# Patient Record
Sex: FEMALE | Race: WHITE | NOT HISPANIC OR LATINO | Employment: OTHER | URBAN - METROPOLITAN AREA
[De-identification: names, ages, dates, MRNs, and addresses within clinical notes are randomized per-mention and may not be internally consistent; named-entity substitution may affect disease eponyms.]

---

## 2023-03-17 PROBLEM — S83.422A SPRAIN OF LATERAL COLLATERAL LIGAMENT OF LEFT KNEE: Status: ACTIVE | Noted: 2023-03-17

## 2023-03-17 PROBLEM — M79.10 MYALGIA: Status: ACTIVE | Noted: 2023-03-17

## 2023-03-17 PROBLEM — K44.9 HIATAL HERNIA: Status: ACTIVE | Noted: 2023-03-17

## 2023-03-17 PROBLEM — E55.9 VITAMIN D DEFICIENCY: Status: ACTIVE | Noted: 2023-03-17

## 2023-03-17 PROBLEM — N18.4 CKD STAGE 4 DUE TO TYPE 2 DIABETES MELLITUS (MULTI): Status: ACTIVE | Noted: 2023-03-17

## 2023-03-17 PROBLEM — E66.9 CLASS 1 OBESITY WITH BODY MASS INDEX (BMI) OF 31.0 TO 31.9 IN ADULT: Status: ACTIVE | Noted: 2023-03-17

## 2023-03-17 PROBLEM — E66.811 CLASS 1 OBESITY WITH BODY MASS INDEX (BMI) OF 31.0 TO 31.9 IN ADULT: Status: ACTIVE | Noted: 2023-03-17

## 2023-03-17 PROBLEM — E11.9 DIABETES MELLITUS, TYPE 2 (MULTI): Status: ACTIVE | Noted: 2023-03-17

## 2023-03-17 PROBLEM — D22.9 ATYPICAL MOLE: Status: ACTIVE | Noted: 2023-03-17

## 2023-03-17 PROBLEM — M25.569 KNEE PAIN: Status: ACTIVE | Noted: 2023-03-17

## 2023-03-17 PROBLEM — N28.9 RENAL DYSFUNCTION: Status: ACTIVE | Noted: 2023-03-17

## 2023-03-17 PROBLEM — K21.9 GERD (GASTROESOPHAGEAL REFLUX DISEASE): Status: ACTIVE | Noted: 2023-03-17

## 2023-03-17 PROBLEM — I25.10 CAD (CORONARY ATHEROSCLEROTIC DISEASE): Status: ACTIVE | Noted: 2023-03-17

## 2023-03-17 PROBLEM — R06.83 SNORING: Status: ACTIVE | Noted: 2023-03-17

## 2023-03-17 PROBLEM — R53.83 TIREDNESS: Status: ACTIVE | Noted: 2023-03-17

## 2023-03-17 PROBLEM — M17.10 PRIMARY LOCALIZED OSTEOARTHRITIS OF KNEE: Status: ACTIVE | Noted: 2023-03-17

## 2023-03-17 PROBLEM — E78.5 HYPERLIPIDEMIA: Status: ACTIVE | Noted: 2023-03-17

## 2023-03-17 PROBLEM — D64.9 ANEMIA: Status: ACTIVE | Noted: 2023-03-17

## 2023-03-17 PROBLEM — R53.83 FATIGUE: Status: ACTIVE | Noted: 2023-03-17

## 2023-03-17 PROBLEM — I10 HYPERTENSION: Status: ACTIVE | Noted: 2023-03-17

## 2023-03-17 PROBLEM — E11.22 CKD STAGE 4 DUE TO TYPE 2 DIABETES MELLITUS (MULTI): Status: ACTIVE | Noted: 2023-03-17

## 2023-03-17 PROBLEM — E79.0 ELEVATED URIC ACID IN BLOOD: Status: ACTIVE | Noted: 2023-03-17

## 2023-03-17 PROBLEM — M65.30 TRIGGER FINGER: Status: ACTIVE | Noted: 2023-03-17

## 2023-03-17 PROBLEM — H26.9 CATARACT: Status: ACTIVE | Noted: 2023-03-17

## 2023-03-17 PROBLEM — R79.82 ELEVATED C-REACTIVE PROTEIN (CRP): Status: ACTIVE | Noted: 2023-03-17

## 2023-03-17 PROBLEM — R00.2 PALPITATIONS: Status: ACTIVE | Noted: 2023-03-17

## 2023-03-17 RX ORDER — PEN NEEDLE, DIABETIC 30 GX3/16"
NEEDLE, DISPOSABLE MISCELLANEOUS
COMMUNITY
End: 2024-03-04 | Stop reason: SDUPTHER

## 2023-03-17 RX ORDER — SYRINGE AND NEEDLE,INSULIN,1ML 31GX15/64"
SYRINGE, EMPTY DISPOSABLE MISCELLANEOUS
COMMUNITY
End: 2024-03-04 | Stop reason: SDUPTHER

## 2023-03-17 RX ORDER — INSULIN GLARGINE 100 [IU]/ML
75 INJECTION, SOLUTION SUBCUTANEOUS EVERY EVENING
COMMUNITY
Start: 2014-08-29 | End: 2023-10-20 | Stop reason: SDUPTHER

## 2023-03-17 RX ORDER — ROSUVASTATIN CALCIUM 10 MG/1
1 TABLET, COATED ORAL DAILY
COMMUNITY
Start: 2020-09-17

## 2023-03-17 RX ORDER — VITAMIN E MIXED 400 UNIT
CAPSULE ORAL
COMMUNITY
Start: 2018-03-22

## 2023-03-17 RX ORDER — VITAMIN E (DL,TOCOPHERYL ACET) 90 MG
1 CAPSULE ORAL DAILY
COMMUNITY
Start: 2018-03-22

## 2023-03-17 RX ORDER — TALC
250 POWDER (GRAM) TOPICAL DAILY
COMMUNITY

## 2023-03-17 RX ORDER — S-ADENOSYLMETHIONINE SUL TOSYL 400 MG
1 TABLET, DELAYED RELEASE (ENTERIC COATED) ORAL DAILY
COMMUNITY

## 2023-03-17 RX ORDER — INSULIN LISPRO 200 [IU]/ML
140 INJECTION, SOLUTION SUBCUTANEOUS
COMMUNITY
Start: 2021-01-28 | End: 2023-10-20 | Stop reason: SDUPTHER

## 2023-03-17 RX ORDER — LISINOPRIL 40 MG/1
1 TABLET ORAL DAILY
COMMUNITY
Start: 2020-06-12 | End: 2023-10-09 | Stop reason: ALTCHOICE

## 2023-03-17 RX ORDER — SPIRONOLACTONE 25 MG
TABLET ORAL
COMMUNITY
Start: 2018-03-22

## 2023-03-17 RX ORDER — METOPROLOL TARTRATE 50 MG/1
0.25 TABLET ORAL DAILY
COMMUNITY
Start: 2014-05-21 | End: 2023-10-09 | Stop reason: ALTCHOICE

## 2023-03-17 RX ORDER — NAPROXEN SODIUM 220 MG/1
TABLET, FILM COATED ORAL
COMMUNITY
Start: 2020-09-15 | End: 2023-10-20 | Stop reason: SINTOL

## 2023-03-17 RX ORDER — BLOOD SUGAR DIAGNOSTIC
STRIP MISCELLANEOUS
COMMUNITY

## 2023-03-17 RX ORDER — PEN NEEDLE, DIABETIC 30 GX3/16"
NEEDLE, DISPOSABLE MISCELLANEOUS
COMMUNITY
End: 2024-02-05 | Stop reason: SDUPTHER

## 2023-03-22 ENCOUNTER — APPOINTMENT (OUTPATIENT)
Dept: PRIMARY CARE | Facility: CLINIC | Age: 73
End: 2023-03-22
Payer: MEDICARE

## 2023-05-30 ENCOUNTER — TELEPHONE (OUTPATIENT)
Dept: PRIMARY CARE | Facility: CLINIC | Age: 73
End: 2023-05-30
Payer: MEDICARE

## 2023-06-01 NOTE — TELEPHONE ENCOUNTER
Can you verify what medication she is talking about?  Nothing on her list and I have not seen her in about 2 years

## 2023-06-07 ENCOUNTER — OFFICE VISIT (OUTPATIENT)
Dept: PRIMARY CARE | Facility: CLINIC | Age: 73
End: 2023-06-07
Payer: MEDICARE

## 2023-06-07 VITALS
DIASTOLIC BLOOD PRESSURE: 60 MMHG | HEIGHT: 66 IN | BODY MASS INDEX: 30.44 KG/M2 | TEMPERATURE: 98.1 F | SYSTOLIC BLOOD PRESSURE: 110 MMHG | WEIGHT: 189.4 LBS

## 2023-06-07 DIAGNOSIS — E79.0 ELEVATED URIC ACID IN BLOOD: ICD-10-CM

## 2023-06-07 DIAGNOSIS — N18.4 CKD STAGE 4 DUE TO TYPE 2 DIABETES MELLITUS (MULTI): ICD-10-CM

## 2023-06-07 DIAGNOSIS — E11.69 TYPE 2 DIABETES MELLITUS WITH OTHER SPECIFIED COMPLICATION, WITH LONG-TERM CURRENT USE OF INSULIN (MULTI): ICD-10-CM

## 2023-06-07 DIAGNOSIS — Z00.00 ROUTINE GENERAL MEDICAL EXAMINATION AT HEALTH CARE FACILITY: ICD-10-CM

## 2023-06-07 DIAGNOSIS — I10 HYPERTENSION, UNSPECIFIED TYPE: Primary | ICD-10-CM

## 2023-06-07 DIAGNOSIS — Z79.4 TYPE 2 DIABETES MELLITUS WITH OTHER SPECIFIED COMPLICATION, WITH LONG-TERM CURRENT USE OF INSULIN (MULTI): ICD-10-CM

## 2023-06-07 DIAGNOSIS — E11.22 CKD STAGE 4 DUE TO TYPE 2 DIABETES MELLITUS (MULTI): ICD-10-CM

## 2023-06-07 PROCEDURE — 3078F DIAST BP <80 MM HG: CPT | Performed by: FAMILY MEDICINE

## 2023-06-07 PROCEDURE — 1170F FXNL STATUS ASSESSED: CPT | Performed by: FAMILY MEDICINE

## 2023-06-07 PROCEDURE — G0439 PPPS, SUBSEQ VISIT: HCPCS | Performed by: FAMILY MEDICINE

## 2023-06-07 PROCEDURE — 1159F MED LIST DOCD IN RCRD: CPT | Performed by: FAMILY MEDICINE

## 2023-06-07 PROCEDURE — 99214 OFFICE O/P EST MOD 30 MIN: CPT | Performed by: FAMILY MEDICINE

## 2023-06-07 PROCEDURE — 1160F RVW MEDS BY RX/DR IN RCRD: CPT | Performed by: FAMILY MEDICINE

## 2023-06-07 PROCEDURE — 1036F TOBACCO NON-USER: CPT | Performed by: FAMILY MEDICINE

## 2023-06-07 PROCEDURE — 4010F ACE/ARB THERAPY RXD/TAKEN: CPT | Performed by: FAMILY MEDICINE

## 2023-06-07 PROCEDURE — 3066F NEPHROPATHY DOC TX: CPT | Performed by: FAMILY MEDICINE

## 2023-06-07 PROCEDURE — 3074F SYST BP LT 130 MM HG: CPT | Performed by: FAMILY MEDICINE

## 2023-06-07 RX ORDER — CYANOCOBALAMIN (VITAMIN B-12) 500 MCG
TABLET ORAL
COMMUNITY

## 2023-06-07 ASSESSMENT — ACTIVITIES OF DAILY LIVING (ADL)
GROCERY_SHOPPING: INDEPENDENT
BATHING: INDEPENDENT
DOING_HOUSEWORK: INDEPENDENT
MANAGING_FINANCES: INDEPENDENT
TAKING_MEDICATION: INDEPENDENT
DRESSING: INDEPENDENT

## 2023-06-07 ASSESSMENT — PATIENT HEALTH QUESTIONNAIRE - PHQ9
2. FEELING DOWN, DEPRESSED OR HOPELESS: NOT AT ALL
1. LITTLE INTEREST OR PLEASURE IN DOING THINGS: NOT AT ALL
SUM OF ALL RESPONSES TO PHQ9 QUESTIONS 1 AND 2: 0
1. LITTLE INTEREST OR PLEASURE IN DOING THINGS: NOT AT ALL
2. FEELING DOWN, DEPRESSED OR HOPELESS: NOT AT ALL
SUM OF ALL RESPONSES TO PHQ9 QUESTIONS 1 AND 2: 0

## 2023-06-07 NOTE — PATIENT INSTRUCTIONS
For scheduling general referrals or orders please call [address] [place] [address] ([phone]).  For scheduling radiology appointments please call [phone].

## 2023-06-07 NOTE — PROGRESS NOTES
Subjective   Reason for Visit: Li Valdovinos is an 73 y.o. female here for a Medicare Wellness visit.     Past Medical, Surgical, and Family History reviewed and updated in chart.         HPI  **care team   endo- dr. Arroyo- following DM2 - retiring so needs new   nephrology- Dr. Ortiz-  for CKD4- gfr down to 18 last month. Considering getting on transplant list. [place] to hold off dialysis   GI Dr. Vollweiler-follows regularly for GERD and colon polyps last colonoscopy she believes was 2018- due in 2023  cardio-Dr Cooper   Podiatry- for nail care  Eye- follows [facility]      Patient Self Assessment of Health Status  Patient Self Assessment: Good    Nutrition and Exercise  Current Diet: Well Balanced Diet  Adequate Fluid Intake: Yes  Caffeine: Yes  Exercise Frequency: Regularly    Functional Ability/Level of Safety  Cognitive Impairment Observed: No cognitive impairment observed  Cognitive Impairment Reported: No cognitive impairment reported by patient or family    Home Safety Risk Factors: None    Patient Care Team:  Remington Rondon DO as PCP - General     Review of Systems   Constitutional:  Negative for activity change, appetite change and fatigue.   HENT:  Negative for congestion, postnasal drip and sinus pressure.    Eyes:  Negative for pain and visual disturbance.   Respiratory:  Negative for cough and shortness of breath.    Cardiovascular:  Negative for chest pain, palpitations and leg swelling.   Gastrointestinal:  Negative for abdominal pain, blood in stool, constipation, diarrhea, nausea and vomiting.   Endocrine: Negative for cold intolerance and heat intolerance.   Genitourinary:  Negative for dysuria and menstrual problem.   Musculoskeletal:  Negative for arthralgias and joint swelling.   Skin:  Negative for rash and wound.   Neurological:  Negative for dizziness, light-headedness and headaches.   Psychiatric/Behavioral:  Negative for dysphoric mood and sleep disturbance. The patient is not  "nervous/anxious.        Objective   Vitals:  /60   Temp 36.7 °C (98.1 °F)   Ht 1.664 m (5' 5.5\")   Wt 85.9 kg (189 lb 6.4 oz)   BMI 31.04 kg/m²       Physical Exam  Vitals and nursing note reviewed.   Constitutional:       Appearance: Normal appearance. She is normal weight.   HENT:      Head: Normocephalic and atraumatic.      Right Ear: External ear normal.      Left Ear: External ear normal.      Mouth/Throat:      Mouth: Mucous membranes are moist.   Eyes:      Conjunctiva/sclera: Conjunctivae normal.   Cardiovascular:      Rate and Rhythm: Normal rate and regular rhythm.      Heart sounds: Normal heart sounds.   Pulmonary:      Effort: Pulmonary effort is normal.      Breath sounds: Normal breath sounds.   Musculoskeletal:         General: No swelling.      Cervical back: Normal range of motion and neck supple.   Skin:     General: Skin is warm and dry.      Capillary Refill: Capillary refill takes less than 2 seconds.   Neurological:      General: No focal deficit present.      Mental Status: She is alert. Mental status is at baseline.   Psychiatric:         Mood and Affect: Mood normal.         Behavior: Behavior normal.         Thought Content: Thought content normal.         Judgment: Judgment normal.         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  Discussed with patient given the severity of her kidney disease I think she should be managed by endocrinology.  We will follow-up after above unless concerns sooner  Continue to follow the range remainder of her care team as listed " above

## 2023-06-13 ASSESSMENT — ACTIVITIES OF DAILY LIVING (ADL)
MANAGING_FINANCES: INDEPENDENT
DRESSING: INDEPENDENT
GROCERY_SHOPPING: INDEPENDENT
TAKING_MEDICATION: INDEPENDENT
BATHING: INDEPENDENT
DOING_HOUSEWORK: INDEPENDENT

## 2023-06-13 ASSESSMENT — ENCOUNTER SYMPTOMS
HEADACHES: 0
CONSTIPATION: 0
SHORTNESS OF BREATH: 0
FATIGUE: 0
PALPITATIONS: 0
BLOOD IN STOOL: 0
VOMITING: 0
NAUSEA: 0
LIGHT-HEADEDNESS: 0
NERVOUS/ANXIOUS: 0
JOINT SWELLING: 0
SINUS PRESSURE: 0
DIARRHEA: 0
SLEEP DISTURBANCE: 0
DYSPHORIC MOOD: 0
WOUND: 0
DYSURIA: 0
ARTHRALGIAS: 0
APPETITE CHANGE: 0
EYE PAIN: 0
COUGH: 0
ACTIVITY CHANGE: 0
DIZZINESS: 0
ABDOMINAL PAIN: 0

## 2023-06-13 ASSESSMENT — PATIENT HEALTH QUESTIONNAIRE - PHQ9
SUM OF ALL RESPONSES TO PHQ9 QUESTIONS 1 AND 2: 0
1. LITTLE INTEREST OR PLEASURE IN DOING THINGS: NOT AT ALL
2. FEELING DOWN, DEPRESSED OR HOPELESS: NOT AT ALL

## 2023-06-20 ENCOUNTER — LAB (OUTPATIENT)
Dept: LAB | Facility: LAB | Age: 73
End: 2023-06-20
Payer: MEDICARE

## 2023-06-20 DIAGNOSIS — Z79.4 TYPE 2 DIABETES MELLITUS WITH OTHER SPECIFIED COMPLICATION, WITH LONG-TERM CURRENT USE OF INSULIN (MULTI): ICD-10-CM

## 2023-06-20 DIAGNOSIS — I10 HYPERTENSION, UNSPECIFIED TYPE: ICD-10-CM

## 2023-06-20 DIAGNOSIS — E11.22 CKD STAGE 4 DUE TO TYPE 2 DIABETES MELLITUS (MULTI): ICD-10-CM

## 2023-06-20 DIAGNOSIS — N18.4 CKD STAGE 4 DUE TO TYPE 2 DIABETES MELLITUS (MULTI): ICD-10-CM

## 2023-06-20 DIAGNOSIS — E11.69 TYPE 2 DIABETES MELLITUS WITH OTHER SPECIFIED COMPLICATION, WITH LONG-TERM CURRENT USE OF INSULIN (MULTI): ICD-10-CM

## 2023-06-20 DIAGNOSIS — E79.0 ELEVATED URIC ACID IN BLOOD: ICD-10-CM

## 2023-06-20 LAB
ALANINE AMINOTRANSFERASE (SGPT) (U/L) IN SER/PLAS: 9 U/L (ref 7–45)
ALBUMIN (G/DL) IN SER/PLAS: 3.8 G/DL (ref 3.4–5)
ALKALINE PHOSPHATASE (U/L) IN SER/PLAS: 47 U/L (ref 33–136)
ANION GAP IN SER/PLAS: 12 MMOL/L (ref 10–20)
ASPARTATE AMINOTRANSFERASE (SGOT) (U/L) IN SER/PLAS: 13 U/L (ref 9–39)
BASOPHILS (10*3/UL) IN BLOOD BY AUTOMATED COUNT: 0.04 X10E9/L (ref 0–0.1)
BASOPHILS/100 LEUKOCYTES IN BLOOD BY AUTOMATED COUNT: 0.5 % (ref 0–2)
BILIRUBIN TOTAL (MG/DL) IN SER/PLAS: 1 MG/DL (ref 0–1.2)
CALCIUM (MG/DL) IN SER/PLAS: 9.7 MG/DL (ref 8.6–10.6)
CARBON DIOXIDE, TOTAL (MMOL/L) IN SER/PLAS: 25 MMOL/L (ref 21–32)
CHLORIDE (MMOL/L) IN SER/PLAS: 104 MMOL/L (ref 98–107)
CHOLESTEROL (MG/DL) IN SER/PLAS: 122 MG/DL (ref 0–199)
CHOLESTEROL IN HDL (MG/DL) IN SER/PLAS: 64 MG/DL
CHOLESTEROL/HDL RATIO: 1.9
CREATININE (MG/DL) IN SER/PLAS: 2.55 MG/DL (ref 0.5–1.05)
EOSINOPHILS (10*3/UL) IN BLOOD BY AUTOMATED COUNT: 0.15 X10E9/L (ref 0–0.4)
EOSINOPHILS/100 LEUKOCYTES IN BLOOD BY AUTOMATED COUNT: 1.9 % (ref 0–6)
ERYTHROCYTE DISTRIBUTION WIDTH (RATIO) BY AUTOMATED COUNT: 12.1 % (ref 11.5–14.5)
ERYTHROCYTE MEAN CORPUSCULAR HEMOGLOBIN CONCENTRATION (G/DL) BY AUTOMATED: 31 G/DL (ref 32–36)
ERYTHROCYTE MEAN CORPUSCULAR VOLUME (FL) BY AUTOMATED COUNT: 97 FL (ref 80–100)
ERYTHROCYTES (10*6/UL) IN BLOOD BY AUTOMATED COUNT: 3.51 X10E12/L (ref 4–5.2)
ESTIMATED AVERAGE GLUCOSE FOR HBA1C: 163 MG/DL
GFR FEMALE: 19 ML/MIN/1.73M2
GLUCOSE (MG/DL) IN SER/PLAS: 105 MG/DL (ref 74–99)
HEMATOCRIT (%) IN BLOOD BY AUTOMATED COUNT: 34.2 % (ref 36–46)
HEMOGLOBIN (G/DL) IN BLOOD: 10.6 G/DL (ref 12–16)
HEMOGLOBIN A1C/HEMOGLOBIN TOTAL IN BLOOD: 7.3 %
IMMATURE GRANULOCYTES/100 LEUKOCYTES IN BLOOD BY AUTOMATED COUNT: 0.3 % (ref 0–0.9)
LDL: 46 MG/DL (ref 0–99)
LEUKOCYTES (10*3/UL) IN BLOOD BY AUTOMATED COUNT: 8 X10E9/L (ref 4.4–11.3)
LYMPHOCYTES (10*3/UL) IN BLOOD BY AUTOMATED COUNT: 2.09 X10E9/L (ref 0.8–3)
LYMPHOCYTES/100 LEUKOCYTES IN BLOOD BY AUTOMATED COUNT: 26.3 % (ref 13–44)
MAGNESIUM (MG/DL) IN SER/PLAS: 2.05 MG/DL (ref 1.6–2.4)
MONOCYTES (10*3/UL) IN BLOOD BY AUTOMATED COUNT: 0.73 X10E9/L (ref 0.05–0.8)
MONOCYTES/100 LEUKOCYTES IN BLOOD BY AUTOMATED COUNT: 9.2 % (ref 2–10)
NEUTROPHILS (10*3/UL) IN BLOOD BY AUTOMATED COUNT: 4.92 X10E9/L (ref 1.6–5.5)
NEUTROPHILS/100 LEUKOCYTES IN BLOOD BY AUTOMATED COUNT: 61.8 % (ref 40–80)
NRBC (PER 100 WBCS) BY AUTOMATED COUNT: 0 /100 WBC (ref 0–0)
PLATELETS (10*3/UL) IN BLOOD AUTOMATED COUNT: 279 X10E9/L (ref 150–450)
POTASSIUM (MMOL/L) IN SER/PLAS: 4.8 MMOL/L (ref 3.5–5.3)
PROTEIN TOTAL: 7.3 G/DL (ref 6.4–8.2)
SODIUM (MMOL/L) IN SER/PLAS: 136 MMOL/L (ref 136–145)
TRIGLYCERIDE (MG/DL) IN SER/PLAS: 59 MG/DL (ref 0–149)
URATE (MG/DL) IN SER/PLAS: 8.3 MG/DL (ref 2.3–6.7)
UREA NITROGEN (MG/DL) IN SER/PLAS: 53 MG/DL (ref 6–23)
VLDL: 12 MG/DL (ref 0–40)

## 2023-06-20 PROCEDURE — 85025 COMPLETE CBC W/AUTO DIFF WBC: CPT

## 2023-06-20 PROCEDURE — 83735 ASSAY OF MAGNESIUM: CPT

## 2023-06-20 PROCEDURE — 83036 HEMOGLOBIN GLYCOSYLATED A1C: CPT

## 2023-06-20 PROCEDURE — 84550 ASSAY OF BLOOD/URIC ACID: CPT

## 2023-06-20 PROCEDURE — 80061 LIPID PANEL: CPT

## 2023-06-20 PROCEDURE — 36415 COLL VENOUS BLD VENIPUNCTURE: CPT

## 2023-06-20 PROCEDURE — 80053 COMPREHEN METABOLIC PANEL: CPT

## 2023-06-21 ENCOUNTER — TELEPHONE (OUTPATIENT)
Dept: PRIMARY CARE | Facility: CLINIC | Age: 73
End: 2023-06-21
Payer: MEDICARE

## 2023-06-21 NOTE — TELEPHONE ENCOUNTER
Patient would like recent blood work results, she went to ER for a gout flare recently and was given prednisone. Do you want her to check her uric acid level?

## 2023-06-23 DIAGNOSIS — M1A.9XX0 CHRONIC GOUT WITHOUT TOPHUS, UNSPECIFIED CAUSE, UNSPECIFIED SITE: Primary | ICD-10-CM

## 2023-06-23 RX ORDER — ALLOPURINOL 100 MG/1
50 TABLET ORAL EVERY OTHER DAY
Qty: 8 TABLET | Refills: 3 | Status: SHIPPED | OUTPATIENT
Start: 2023-06-23 | End: 2023-10-09 | Stop reason: SDUPTHER

## 2023-06-23 NOTE — RESULT ENCOUNTER NOTE
Let patient know her uric acid level is up.  When she is out of her gout flare she can start allopurinol 50 mg which would be half a pill every other day.  This is the maximum given her kidney function good her kidney function continues to decrease.  Please make sure she is following with nephrology.  Her anemia is stable and likely due to chronic kidney disease  A1c is about stable    **DO NOT CLOSE UNTIL YOU SPEAK TO PATIENT**

## 2023-06-23 NOTE — TELEPHONE ENCOUNTER
----- Message from Remington Rondon DO sent at 6/23/2023  9:16 AM EDT -----  Let patient know her uric acid level is up.  When she is out of her gout flare she can start allopurinol 50 mg which would be half a pill every other day.  This is the maximum given her kidney function good her kidney function continues to decrease.  Please make sure she is following with nephrology.  Her anemia is stable and likely due to chronic kidney disease  A1c is about stable    ##DO NOT CLOSE UNTIL YOU SPEAK TO PATIENT##

## 2023-06-26 NOTE — TELEPHONE ENCOUNTER
Spoke with patient, relayed results to her. She states her nephrologist filled her allopunerol and told her it was okay for her to take daily

## 2023-09-06 VITALS
TEMPERATURE: 98.1 F | OXYGEN SATURATION: 99 % | HEIGHT: 66 IN | RESPIRATION RATE: 20 BRPM | DIASTOLIC BLOOD PRESSURE: 69 MMHG | BODY MASS INDEX: 29.23 KG/M2 | HEART RATE: 72 BPM | SYSTOLIC BLOOD PRESSURE: 132 MMHG | WEIGHT: 181.88 LBS

## 2023-09-13 PROBLEM — K63.5 COLON POLYPS: Status: ACTIVE | Noted: 2023-09-13

## 2023-09-13 PROBLEM — E79.0 ASYMPTOMATIC HYPERURICEMIA: Status: ACTIVE | Noted: 2023-09-13

## 2023-09-13 PROBLEM — S83.92XA LEFT KNEE SPRAIN: Status: ACTIVE | Noted: 2023-09-13

## 2023-09-13 PROBLEM — D63.1 ANEMIA OF CHRONIC RENAL FAILURE: Status: ACTIVE | Noted: 2023-09-13

## 2023-09-13 PROBLEM — Z79.4 UNCONTROLLED TYPE 2 DIABETES MELLITUS WITH HYPERGLYCEMIA, WITH LONG-TERM CURRENT USE OF INSULIN (MULTI): Status: ACTIVE | Noted: 2023-09-13

## 2023-09-13 PROBLEM — Z78.0 MENOPAUSE: Status: ACTIVE | Noted: 2023-09-13

## 2023-09-13 PROBLEM — N18.9 ANEMIA OF CHRONIC RENAL FAILURE: Status: ACTIVE | Noted: 2023-09-13

## 2023-09-13 PROBLEM — E11.65 UNCONTROLLED TYPE 2 DIABETES MELLITUS WITH HYPERGLYCEMIA, WITH LONG-TERM CURRENT USE OF INSULIN (MULTI): Status: ACTIVE | Noted: 2023-09-13

## 2023-09-13 PROBLEM — M19.90 ARTHRITIS: Status: ACTIVE | Noted: 2023-09-13

## 2023-09-13 RX ORDER — METOPROLOL TARTRATE 25 MG/1
0.5 TABLET, FILM COATED ORAL DAILY
COMMUNITY
End: 2023-10-09 | Stop reason: SDUPTHER

## 2023-09-13 RX ORDER — UBIDECARENONE 30 MG
CAPSULE ORAL DAILY
COMMUNITY
End: 2023-10-20 | Stop reason: ALTCHOICE

## 2023-09-13 RX ORDER — GLYBURIDE-METFORMIN HYDROCHLORIDE 5; 500 MG/1; MG/1
2 TABLET ORAL
COMMUNITY
End: 2023-10-20 | Stop reason: ALTCHOICE

## 2023-09-13 RX ORDER — VITAMIN E 268 MG
400 CAPSULE ORAL DAILY
COMMUNITY
Start: 2013-03-18

## 2023-09-13 RX ORDER — FAMOTIDINE 40 MG/1
TABLET, FILM COATED ORAL
COMMUNITY
End: 2023-10-20 | Stop reason: ALTCHOICE

## 2023-09-13 RX ORDER — PREDNISONE 20 MG/1
TABLET ORAL
COMMUNITY
Start: 2023-06-19 | End: 2024-04-16 | Stop reason: WASHOUT

## 2023-09-13 RX ORDER — FERROUS SULFATE 325(65) MG
325 TABLET ORAL 2 TIMES DAILY
COMMUNITY
Start: 2013-03-18 | End: 2023-10-20 | Stop reason: ALTCHOICE

## 2023-09-13 RX ORDER — LISINOPRIL 10 MG/1
20 TABLET ORAL DAILY
COMMUNITY
End: 2023-10-16 | Stop reason: SDUPTHER

## 2023-09-13 RX ORDER — LISINOPRIL 20 MG/1
1 TABLET ORAL DAILY
COMMUNITY
Start: 2020-06-12 | End: 2023-10-09 | Stop reason: ALTCHOICE

## 2023-10-09 ENCOUNTER — OFFICE VISIT (OUTPATIENT)
Dept: PRIMARY CARE | Facility: CLINIC | Age: 73
End: 2023-10-09
Payer: MEDICARE

## 2023-10-09 VITALS
BODY MASS INDEX: 30.75 KG/M2 | TEMPERATURE: 97.7 F | WEIGHT: 184.8 LBS | DIASTOLIC BLOOD PRESSURE: 76 MMHG | SYSTOLIC BLOOD PRESSURE: 130 MMHG

## 2023-10-09 DIAGNOSIS — M1A.9XX0 CHRONIC GOUT WITHOUT TOPHUS, UNSPECIFIED CAUSE, UNSPECIFIED SITE: ICD-10-CM

## 2023-10-09 DIAGNOSIS — Z12.39 BREAST SCREENING: ICD-10-CM

## 2023-10-09 DIAGNOSIS — N18.4 CKD STAGE 4 DUE TO TYPE 2 DIABETES MELLITUS (MULTI): Primary | ICD-10-CM

## 2023-10-09 DIAGNOSIS — Z78.0 POST-MENOPAUSAL: ICD-10-CM

## 2023-10-09 DIAGNOSIS — I10 PRIMARY HYPERTENSION: ICD-10-CM

## 2023-10-09 DIAGNOSIS — Z12.31 ENCOUNTER FOR SCREENING MAMMOGRAM FOR MALIGNANT NEOPLASM OF BREAST: ICD-10-CM

## 2023-10-09 DIAGNOSIS — E11.22 CKD STAGE 4 DUE TO TYPE 2 DIABETES MELLITUS (MULTI): Primary | ICD-10-CM

## 2023-10-09 PROCEDURE — 4010F ACE/ARB THERAPY RXD/TAKEN: CPT | Performed by: FAMILY MEDICINE

## 2023-10-09 PROCEDURE — 1125F AMNT PAIN NOTED PAIN PRSNT: CPT | Performed by: FAMILY MEDICINE

## 2023-10-09 PROCEDURE — 1159F MED LIST DOCD IN RCRD: CPT | Performed by: FAMILY MEDICINE

## 2023-10-09 PROCEDURE — 99214 OFFICE O/P EST MOD 30 MIN: CPT | Performed by: FAMILY MEDICINE

## 2023-10-09 PROCEDURE — 1160F RVW MEDS BY RX/DR IN RCRD: CPT | Performed by: FAMILY MEDICINE

## 2023-10-09 PROCEDURE — 3078F DIAST BP <80 MM HG: CPT | Performed by: FAMILY MEDICINE

## 2023-10-09 PROCEDURE — 1036F TOBACCO NON-USER: CPT | Performed by: FAMILY MEDICINE

## 2023-10-09 PROCEDURE — 3066F NEPHROPATHY DOC TX: CPT | Performed by: FAMILY MEDICINE

## 2023-10-09 PROCEDURE — 3051F HG A1C>EQUAL 7.0%<8.0%: CPT | Performed by: FAMILY MEDICINE

## 2023-10-09 PROCEDURE — 3075F SYST BP GE 130 - 139MM HG: CPT | Performed by: FAMILY MEDICINE

## 2023-10-09 RX ORDER — ALLOPURINOL 100 MG/1
50 TABLET ORAL EVERY OTHER DAY
Qty: 23 TABLET | Refills: 0 | Status: SHIPPED | OUTPATIENT
Start: 2023-10-09 | End: 2024-02-09 | Stop reason: SDUPTHER

## 2023-10-09 RX ORDER — METOPROLOL TARTRATE 25 MG/1
12.5 TABLET, FILM COATED ORAL DAILY
Qty: 45 TABLET | Refills: 3 | Status: SHIPPED | OUTPATIENT
Start: 2023-10-09 | End: 2024-03-04 | Stop reason: SDUPTHER

## 2023-10-09 ASSESSMENT — ENCOUNTER SYMPTOMS
LIGHT-HEADEDNESS: 0
NAUSEA: 0
JOINT SWELLING: 1
WOUND: 0
DIARRHEA: 0
VOMITING: 0
SINUS PRESSURE: 0
EYE PAIN: 0
CONSTIPATION: 0
BLOOD IN STOOL: 0
SHORTNESS OF BREATH: 0
DIZZINESS: 0
APPETITE CHANGE: 0
ARTHRALGIAS: 0
COUGH: 0
DYSURIA: 0
SLEEP DISTURBANCE: 0
PALPITATIONS: 0
ACTIVITY CHANGE: 0
NERVOUS/ANXIOUS: 0
FATIGUE: 0
ABDOMINAL PAIN: 0
HEADACHES: 0
DYSPHORIC MOOD: 0

## 2023-10-09 ASSESSMENT — PATIENT HEALTH QUESTIONNAIRE - PHQ9
SUM OF ALL RESPONSES TO PHQ9 QUESTIONS 1 AND 2: 0
1. LITTLE INTEREST OR PLEASURE IN DOING THINGS: NOT AT ALL
2. FEELING DOWN, DEPRESSED OR HOPELESS: NOT AT ALL
1. LITTLE INTEREST OR PLEASURE IN DOING THINGS: NOT AT ALL
2. FEELING DOWN, DEPRESSED OR HOPELESS: NOT AT ALL
SUM OF ALL RESPONSES TO PHQ9 QUESTIONS 1 AND 2: 0

## 2023-10-09 NOTE — PROGRESS NOTES
Subjective   Patient ID: Li Valdovinos is a 73 y.o. female who presents for Follow-up.    HPI   **care team   endo- Dr. Franco - following DM2 -now establish.  Has a follow-up next week  nephrology- Dr. Mcbride-  for CKD4-has follow-up next week  GI Dr. Vollweiler-follows regularly for GERD and colon polyps last colonoscopy she believes was 2021- due 2024  cardio-Dr Cooper   Podiatry- for nail care  Eye- follows [facility]     Gout - having flares on 50 mg allopurinol every other day  Wondering if this dose can be increased    No new concerns  Review of Systems   Constitutional:  Negative for activity change, appetite change and fatigue.   HENT:  Negative for congestion, postnasal drip and sinus pressure.    Eyes:  Negative for pain and visual disturbance.   Respiratory:  Negative for cough and shortness of breath.    Cardiovascular:  Negative for chest pain, palpitations and leg swelling.   Gastrointestinal:  Negative for abdominal pain, blood in stool, constipation, diarrhea, nausea and vomiting.   Endocrine: Negative for cold intolerance and heat intolerance.   Genitourinary:  Negative for dysuria and menstrual problem.   Musculoskeletal:  Positive for joint swelling (great toe). Negative for arthralgias.   Skin:  Negative for rash and wound.   Neurological:  Negative for dizziness, light-headedness and headaches.   Psychiatric/Behavioral:  Negative for dysphoric mood and sleep disturbance. The patient is not nervous/anxious.        Objective   /76   Temp 36.5 °C (97.7 °F)   Wt 83.8 kg (184 lb 12.8 oz)   BMI 30.75 kg/m²     Physical Exam  Vitals and nursing note reviewed.   Constitutional:       Appearance: Normal appearance. She is normal weight.   HENT:      Head: Normocephalic and atraumatic.      Right Ear: External ear normal.      Left Ear: External ear normal.      Mouth/Throat:      Mouth: Mucous membranes are moist.   Eyes:      Conjunctiva/sclera: Conjunctivae normal.   Cardiovascular:      Rate  and Rhythm: Normal rate and regular rhythm.      Heart sounds: Normal heart sounds.   Pulmonary:      Effort: Pulmonary effort is normal.      Breath sounds: Normal breath sounds.   Musculoskeletal:         General: No swelling.      Cervical back: Normal range of motion and neck supple.      Right lower leg: No edema.      Left lower leg: No edema.   Skin:     General: Skin is warm and dry.      Capillary Refill: Capillary refill takes less than 2 seconds.   Neurological:      General: No focal deficit present.      Mental Status: She is alert. Mental status is at baseline.   Psychiatric:         Mood and Affect: Mood normal.         Behavior: Behavior normal.         Thought Content: Thought content normal.         Judgment: Judgment normal.         Assessment/Plan   1. Chronic gout without tophus, unspecified cause, unspecified site  Dw pt need to talk to nephro about increased dosing.  - allopurinol (Zyloprim) 100 mg tablet; Take 0.5 tablets (50 mg) by mouth every other day.  Dispense: 23 tablet; Refill: 0    2. 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 Primary hypertension  Controlled. Continue current medicines/regimen.   - metoprolol tartrate (Lopressor) 25 mg tablet; Take 0.5 tablets (12.5 mg) by mouth once daily.  Dispense: 45 tablet; Refill: 3    4. Breast screening  - BI mammo bilateral screening tomosynthesis; Future    5. Encounter for screening mammogram for malignant neoplasm of breast  - BI mammo bilateral screening tomosynthesis; Future    6. Post-menopausal  - XR DEXA bone density; Future    Cont with care team  Awv in 6/24  unless concern sooner   Remington Rondon, DO

## 2023-10-10 ENCOUNTER — APPOINTMENT (OUTPATIENT)
Dept: CARDIOLOGY | Facility: CLINIC | Age: 73
End: 2023-10-10
Payer: MEDICARE

## 2023-10-16 ENCOUNTER — LAB (OUTPATIENT)
Dept: LAB | Facility: LAB | Age: 73
End: 2023-10-16
Payer: MEDICARE

## 2023-10-16 ENCOUNTER — OFFICE VISIT (OUTPATIENT)
Dept: NEPHROLOGY | Facility: CLINIC | Age: 73
End: 2023-10-16
Payer: MEDICARE

## 2023-10-16 VITALS
WEIGHT: 182.8 LBS | SYSTOLIC BLOOD PRESSURE: 130 MMHG | DIASTOLIC BLOOD PRESSURE: 85 MMHG | TEMPERATURE: 98.1 F | BODY MASS INDEX: 30.46 KG/M2 | RESPIRATION RATE: 18 BRPM | HEIGHT: 65 IN | HEART RATE: 75 BPM

## 2023-10-16 DIAGNOSIS — N18.4 CKD STAGE 4 DUE TO TYPE 2 DIABETES MELLITUS (MULTI): ICD-10-CM

## 2023-10-16 DIAGNOSIS — E11.22 CKD STAGE 4 DUE TO TYPE 2 DIABETES MELLITUS (MULTI): ICD-10-CM

## 2023-10-16 DIAGNOSIS — N18.4 CKD (CHRONIC KIDNEY DISEASE) STAGE 4, GFR 15-29 ML/MIN (MULTI): Primary | ICD-10-CM

## 2023-10-16 LAB
25(OH)D3 SERPL-MCNC: 40 NG/ML (ref 30–100)
ALBUMIN SERPL BCP-MCNC: 4.5 G/DL (ref 3.4–5)
ANION GAP SERPL CALC-SCNC: 14 MMOL/L (ref 10–20)
BUN SERPL-MCNC: 34 MG/DL (ref 6–23)
CALCIUM SERPL-MCNC: 10 MG/DL (ref 8.6–10.6)
CHLORIDE SERPL-SCNC: 106 MMOL/L (ref 98–107)
CO2 SERPL-SCNC: 24 MMOL/L (ref 21–32)
CREAT SERPL-MCNC: 2.32 MG/DL (ref 0.5–1.05)
CREAT UR-MCNC: 76.4 MG/DL (ref 20–320)
GFR SERPL CREATININE-BSD FRML MDRD: 22 ML/MIN/1.73M*2
GLUCOSE SERPL-MCNC: 132 MG/DL (ref 74–99)
HCT VFR BLD AUTO: 37.9 % (ref 36–46)
HGB BLD-MCNC: 11.9 G/DL (ref 12–16)
MICROALBUMIN UR-MCNC: <7 MG/L
MICROALBUMIN/CREAT UR: NORMAL MG/G{CREAT}
PHOSPHATE SERPL-MCNC: 2.8 MG/DL (ref 2.5–4.9)
POTASSIUM SERPL-SCNC: 4.2 MMOL/L (ref 3.5–5.3)
PTH-INTACT SERPL-MCNC: 84 PG/ML (ref 18.5–88)
SODIUM SERPL-SCNC: 140 MMOL/L (ref 136–145)

## 2023-10-16 PROCEDURE — 83970 ASSAY OF PARATHORMONE: CPT

## 2023-10-16 PROCEDURE — 3051F HG A1C>EQUAL 7.0%<8.0%: CPT | Performed by: INTERNAL MEDICINE

## 2023-10-16 PROCEDURE — 3075F SYST BP GE 130 - 139MM HG: CPT | Performed by: INTERNAL MEDICINE

## 2023-10-16 PROCEDURE — 1160F RVW MEDS BY RX/DR IN RCRD: CPT | Performed by: INTERNAL MEDICINE

## 2023-10-16 PROCEDURE — 1036F TOBACCO NON-USER: CPT | Performed by: INTERNAL MEDICINE

## 2023-10-16 PROCEDURE — 99214 OFFICE O/P EST MOD 30 MIN: CPT | Performed by: INTERNAL MEDICINE

## 2023-10-16 PROCEDURE — 3066F NEPHROPATHY DOC TX: CPT | Performed by: INTERNAL MEDICINE

## 2023-10-16 PROCEDURE — 4010F ACE/ARB THERAPY RXD/TAKEN: CPT | Performed by: INTERNAL MEDICINE

## 2023-10-16 PROCEDURE — 3062F POS MACROALBUMINURIA REV: CPT | Performed by: INTERNAL MEDICINE

## 2023-10-16 PROCEDURE — 36415 COLL VENOUS BLD VENIPUNCTURE: CPT

## 2023-10-16 PROCEDURE — 82306 VITAMIN D 25 HYDROXY: CPT

## 2023-10-16 PROCEDURE — 1125F AMNT PAIN NOTED PAIN PRSNT: CPT | Performed by: INTERNAL MEDICINE

## 2023-10-16 PROCEDURE — 80069 RENAL FUNCTION PANEL: CPT

## 2023-10-16 PROCEDURE — 82043 UR ALBUMIN QUANTITATIVE: CPT

## 2023-10-16 PROCEDURE — 85014 HEMATOCRIT: CPT

## 2023-10-16 PROCEDURE — 82570 ASSAY OF URINE CREATININE: CPT

## 2023-10-16 PROCEDURE — 85018 HEMOGLOBIN: CPT

## 2023-10-16 PROCEDURE — 1159F MED LIST DOCD IN RCRD: CPT | Performed by: INTERNAL MEDICINE

## 2023-10-16 PROCEDURE — 3079F DIAST BP 80-89 MM HG: CPT | Performed by: INTERNAL MEDICINE

## 2023-10-16 RX ORDER — LISINOPRIL 20 MG/1
20 TABLET ORAL DAILY
Qty: 60 TABLET | Refills: 3 | Status: SHIPPED | OUTPATIENT
Start: 2023-10-16 | End: 2024-03-04 | Stop reason: SDUPTHER

## 2023-10-16 NOTE — PATIENT INSTRUCTIONS
Please check blood and urine tests today, if GFR is more than 19 ml/min I recommend starting empagliflozin

## 2023-10-16 NOTE — PROGRESS NOTES
"For follow up, doing well.  No complaints  No hospitalizations/illness since last visit  Not checking BP at home  Denies orthostatic symptoms    RoS negative for all other systems except as noted above.    Visit Vitals  /85 (BP Location: Right arm, Patient Position: Sitting, BP Cuff Size: Adult)   Pulse 75   Temp 36.7 °C (98.1 °F) (Temporal)   Resp 18   Ht 1.651 m (5' 5\")   Wt 82.9 kg (182 lb 12.8 oz)   BMI 30.42 kg/m²   Smoking Status Never   BSA 1.95 m²      No distress  HEENT:  moist, no pallor  No edema anabella LE  Breath sounds anabella equal, clear  S1 S2 regular, normal, no rub or murmur  Abdomen soft, non tender  AAO x3, non focal      Sodium   Date/Time Value Ref Range Status   06/20/2023 09:17  136 - 145 mmol/L Final   04/21/2022 10:48  136 - 145 mmol/L Final   11/24/2020 03:13  136 - 145 mmol/L Final     Potassium   Date/Time Value Ref Range Status   06/20/2023 09:17 AM 4.8 3.5 - 5.3 mmol/L Final   04/21/2022 10:48 AM 4.6 3.5 - 5.3 mmol/L Final   11/24/2020 03:13 PM 4.3 3.5 - 5.3 mmol/L Final     Chloride   Date/Time Value Ref Range Status   06/20/2023 09:17  98 - 107 mmol/L Final   04/21/2022 10:48  98 - 107 mmol/L Final   11/24/2020 03:13  98 - 107 mmol/L Final     Urea Nitrogen   Date/Time Value Ref Range Status   06/20/2023 09:17 AM 53 (H) 6 - 23 mg/dL Final   04/21/2022 10:48 AM 30 (H) 6 - 23 mg/dL Final   11/24/2020 03:13 PM 34 (H) 6 - 23 mg/dL Final     Creatinine   Date/Time Value Ref Range Status   06/20/2023 09:17 AM 2.55 (H) 0.50 - 1.05 mg/dL Final   04/21/2022 10:48 AM 2.08 (H) 0.50 - 1.05 mg/dL Final   11/24/2020 03:13 PM 2.09 (H) 0.50 - 1.05 mg/dL Final     GFR Female   Date/Time Value Ref Range Status   06/20/2023 09:17 AM 19 (A) >90 mL/min/1.73m2 Final     Comment:      CALCULATIONS OF ESTIMATED GFR ARE PERFORMED   USING THE 2021 CKD-EPI STUDY REFIT EQUATION   WITHOUT THE RACE VARIABLE FOR THE IDMS-TRACEABLE   CREATININE " METHODS.    https://jasn.asnjournals.org/content/early/2021/09/22/ASN.[email or web address]   04/21/2022 10:48 AM 25 (A) >90 mL/min/1.73m2 Final     Comment:      CALCULATIONS OF ESTIMATED GFR ARE PERFORMED   USING THE 2021 CKD-EPI STUDY REFIT EQUATION   WITHOUT THE RACE VARIABLE FOR THE IDMS-TRACEABLE   CREATININE METHODS.    https://jasn.asnjournals.org/content/early/2021/09/22/ASN.[email or web address]       Calcium   Date/Time Value Ref Range Status   06/20/2023 09:17 AM 9.7 8.6 - 10.6 mg/dL Final   04/21/2022 10:48 AM 9.5 8.6 - 10.3 mg/dL Final   11/24/2020 03:13 PM 9.3 8.6 - 10.6 mg/dL Final     Phosphorus   Date/Time Value Ref Range Status   08/18/2020 08:30 AM 4.4 2.5 - 4.9 mg/dL Final     Comment:      The performance characteristics of phosphorus testing in   heparinized plasma have been validated by the individual     laboratory site where testing is performed. Testing    on heparinized plasma is not approved by the FDA;    however, such approval is not necessary.       Vitamin D, 25-Hydroxy   Date/Time Value Ref Range Status   06/09/2020 09:09 AM 38 ng/mL Final     Comment:     .  DEFICIENCY:         < 20   NG/ML  INSUFFICIENCY:      20-29  NG/ML  SUFFICIENCY:         NG/ML    THIS ASSAY ACCURATELY QUANTIFIES THE SUM OF  VITAMIN D3, 25-HYDROXY AND VIT D2,25-HYDROXY.     12/04/2018 03:24 PM 48 ng/mL Final     Comment:     .  DEFICIENCY:         < 20  NG/ML  INSUFFICIENCY:      20-29 NG/ML  OPTIMUM LEVEL:      30-80 NG/ML  POSSIBLE TOXICITY:  > 80  NG/ML    THIS ASSAY ACCURATELY QUANTIFIES THE SUM OF  VITAMIN D3, 25-HYDROXY AND VIT D2,25-HYDROXY.     08/13/2018 10:29 AM 29 (A) ng/mL Final     Comment:     .  DEFICIENCY:         < 20  NG/ML  INSUFFICIENCY:      20-29 NG/ML  OPTIMUM LEVEL:      30-80 NG/ML  POSSIBLE TOXICITY:  > 80  NG/ML    THIS ASSAY ACCURATELY QUANTIFIES THE SUM OF  VITAMIN D3, 25-HYDROXY AND VIT D2,25-HYDROXY.       Albumin/Creatine Ratio   Date/Time Value Ref Range Status   08/18/2020 08:30 AM SEE  COMMENT 0.0 - 30.0 ug/mg crt Final     Comment:     One or more analytes used in this calculation   is outside of the analytical measurement range.  Calculation cannot be performed.       Hemoglobin   Date/Time Value Ref Range Status   06/20/2023 09:17 AM 10.6 (L) 12.0 - 16.0 g/dL Final   04/21/2022 10:48 AM 11.6 (L) 12.0 - 16.0 g/dL Final   11/24/2020 03:13 PM 10.5 (L) 12.0 - 16.0 g/dL Final        Current Outpatient Medications:     allopurinol (Zyloprim) 100 mg tablet, Take 0.5 tablets (50 mg) by mouth every other day., Disp: 23 tablet, Rfl: 0    alpha tocopherol (Vitamin E) 268 mg (400 unit) capsule, Take 1 capsule (400 Units) by mouth twice a day., Disp: , Rfl:     Bacillus subtilis-inulin 1.5 billion cell-1 gram tablet,chewable, Chew once daily.  1 by mouth every day, Disp: , Rfl:     blood-glucose sensor (DEXCOM G6 SENSOR MISC), Use as directed for continuous glucose monitoring. Change every 10 days, Disp: , Rfl:     cholecalciferol (Vitamin D-3) 10 MCG (400 UNIT) tablet, Take by mouth., Disp: , Rfl:     co-enzyme Q-10 30 mg capsule, Take by mouth once daily.  1 po once daily, Disp: , Rfl:     coenzyme Q10 400 mg capsule, TAKE AS DIRECTED., Disp: , Rfl:     metoprolol tartrate (Lopressor) 25 mg tablet, Take 0.5 tablets (12.5 mg) by mouth once daily., Disp: 45 tablet, Rfl: 3    aspirin 81 mg chewable tablet, USE AS DIRECTED., Disp: , Rfl:     blood sugar diagnostic (ONETOUCH ULTRA BLUE TEST STRIP MISC), Use to monitor glucose level four times a day, Disp: , Rfl:     blood-glucose transmitter (DEXCOM G4 TRANSMITTER MISC), Use as directed for continous glucose monitoring change every 10 days, Disp: , Rfl:     famotidine (Pepcid) 40 mg tablet, 1 po twice daily, Disp: , Rfl:     ferrous sulfate 325 (65 Fe) MG tablet, Take 1 tablet (325 mg) by mouth 2 times a day., Disp: , Rfl:     glyburide-metformin (Glucovance) 5-500 mg tablet, Take 2 tablets by mouth 2 times a day with meals., Disp: , Rfl:     insulin glargine  "(Lantus) 100 unit/mL injection, Inject 75 Units under the skin once daily in the evening., Disp: , Rfl:     insulin lispro (HumaLOG KwikPen Insulin) 200 unit/mL (3 mL) insulin pen pen, Inject 140 Units under the skin. per 24 hours to cover meals, Disp: , Rfl:     insulin syringe-needle U-100 1 mL 31 gauge x 15/64\" syringe, Use and discard 1 syringe daily, Disp: , Rfl:     Lactobacillus acidophilus (PROBIOTIC ORAL), Use as directed, Disp: , Rfl:     lisinopril 10 mg tablet, Take 1 tablet (10 mg) by mouth once daily., Disp: , Rfl:     lutein 20 mg capsule, TAKE AS DIRECTED., Disp: , Rfl:     magnesium oxide (Mag-Ox) 250 mg magnesium tablet, , Disp: , Rfl:     OneTouch Ultra Test strip, In Vitro Strip - Use to monitor glucose level four times a day, Disp: , Rfl:     pen needle, diabetic 32 gauge x 5/32\" needle, Use 3 daily, Disp: , Rfl:     pen needle, diabetic 32 gauge x 5/32\" needle, Use and discard 3 pen needles daily., Disp: , Rfl:     predniSONE (Deltasone) 20 mg tablet, TAKE 1 TABLET BY MOUTH TWICE DAILY FOR 1 DAY. THEN TAKE 1/2 PILLL 2 TIMES A DAY FOR 5 DAYS, Disp: , Rfl:     rosuvastatin (Crestor) 10 mg tablet, Take 1 tablet (10 mg) by mouth once daily., Disp: , Rfl:     s-adenosylmethionine (LAILA-e, Enteric Coated,) 400 mg tablet,delayed release (DR/EC), , Disp: , Rfl:     TURMERIC ORAL, Turmeric Curcumin Oral Capsule  Refills: 0      Start : 24-Jun-2021  Active, Disp: , Rfl:     VITAMIN B COMPLEX ORAL, Take 1 tablet by mouth once daily. Super B-Complex, Disp: , Rfl:     vitamin E 180 mg (400 unit) capsule, Vitamin E 400 UNIT Oral Capsule  Refills: 0      Start : 22-Mar-2018  Active, Disp: , Rfl:     vitamin-B complex split tablet, Take 1 tablet (2 half tablet) by mouth once daily., Disp: , Rfl:      73-year-old with longstanding history of diabetes, hypertension, substantial NSAID use in the past with chronic kidney disease for follow-up.    #CKD G4 A1: Exact etiology unclear, possibly nonproteinuric diabetic " kidney disease or hypertensive kidney disease with worsening from chronic NSAID use in the past.  No labs available since June, advised her to check labs today.  Repeat BMP shows creatinine 2.3 mg per DL with EGFR 22 mL/min, stable.  I discussed with her again the benefits and risks of SGLT2 inhibitors and ways of mitigating some of the risks such as urinary tract infections.  I recommended that if her repeat GFR is more than 20, she should start Jardiance daily.  Continue to avoid NSAIDs.  Continue 0.6 g/kg/day protein limited, predominantly plant source diet.    #Hypertension: Goal blood pressure 120/80 mmHg, at goal.  Continue metoprolol and lisinopril.  2.5 g/day sodium restricted diet.    RTC: 3 months

## 2023-10-20 ENCOUNTER — OFFICE VISIT (OUTPATIENT)
Dept: ENDOCRINOLOGY | Facility: CLINIC | Age: 73
End: 2023-10-20
Payer: MEDICARE

## 2023-10-20 VITALS
DIASTOLIC BLOOD PRESSURE: 67 MMHG | HEART RATE: 96 BPM | HEIGHT: 65 IN | WEIGHT: 176 LBS | SYSTOLIC BLOOD PRESSURE: 125 MMHG | BODY MASS INDEX: 29.32 KG/M2

## 2023-10-20 DIAGNOSIS — N18.4 CKD STAGE 4 DUE TO TYPE 2 DIABETES MELLITUS (MULTI): Primary | ICD-10-CM

## 2023-10-20 DIAGNOSIS — I10 PRIMARY HYPERTENSION: ICD-10-CM

## 2023-10-20 DIAGNOSIS — R93.1 AGATSTON CORONARY ARTERY CALCIUM SCORE BETWEEN 200 AND 399: ICD-10-CM

## 2023-10-20 DIAGNOSIS — E11.65 TYPE 2 DIABETES MELLITUS WITH HYPERGLYCEMIA, UNSPECIFIED WHETHER LONG TERM INSULIN USE (MULTI): ICD-10-CM

## 2023-10-20 DIAGNOSIS — E11.22 CKD STAGE 4 DUE TO TYPE 2 DIABETES MELLITUS (MULTI): Primary | ICD-10-CM

## 2023-10-20 DIAGNOSIS — E78.2 MIXED HYPERLIPIDEMIA: ICD-10-CM

## 2023-10-20 DIAGNOSIS — I25.10 ATHEROSCLEROSIS OF CORONARY ARTERY OF NATIVE HEART WITHOUT ANGINA PECTORIS, UNSPECIFIED VESSEL OR LESION TYPE: ICD-10-CM

## 2023-10-20 LAB — POC HEMOGLOBIN A1C: 6.6 % (ref 4.2–6.5)

## 2023-10-20 PROCEDURE — 1160F RVW MEDS BY RX/DR IN RCRD: CPT | Performed by: NURSE PRACTITIONER

## 2023-10-20 PROCEDURE — 3074F SYST BP LT 130 MM HG: CPT | Performed by: NURSE PRACTITIONER

## 2023-10-20 PROCEDURE — 95251 CONT GLUC MNTR ANALYSIS I&R: CPT | Performed by: NURSE PRACTITIONER

## 2023-10-20 PROCEDURE — 83036 HEMOGLOBIN GLYCOSYLATED A1C: CPT | Performed by: NURSE PRACTITIONER

## 2023-10-20 PROCEDURE — 1159F MED LIST DOCD IN RCRD: CPT | Performed by: NURSE PRACTITIONER

## 2023-10-20 PROCEDURE — 1036F TOBACCO NON-USER: CPT | Performed by: NURSE PRACTITIONER

## 2023-10-20 PROCEDURE — 99214 OFFICE O/P EST MOD 30 MIN: CPT | Performed by: NURSE PRACTITIONER

## 2023-10-20 PROCEDURE — 3066F NEPHROPATHY DOC TX: CPT | Performed by: NURSE PRACTITIONER

## 2023-10-20 PROCEDURE — 4010F ACE/ARB THERAPY RXD/TAKEN: CPT | Performed by: NURSE PRACTITIONER

## 2023-10-20 PROCEDURE — 3078F DIAST BP <80 MM HG: CPT | Performed by: NURSE PRACTITIONER

## 2023-10-20 PROCEDURE — 1125F AMNT PAIN NOTED PAIN PRSNT: CPT | Performed by: NURSE PRACTITIONER

## 2023-10-20 PROCEDURE — 3051F HG A1C>EQUAL 7.0%<8.0%: CPT | Performed by: NURSE PRACTITIONER

## 2023-10-20 PROCEDURE — 3062F POS MACROALBUMINURIA REV: CPT | Performed by: NURSE PRACTITIONER

## 2023-10-20 RX ORDER — INSULIN LISPRO 200 [IU]/ML
INJECTION, SOLUTION SUBCUTANEOUS
Qty: 400 ML | Refills: 3 | Status: SHIPPED | OUTPATIENT
Start: 2023-10-20 | End: 2024-03-04 | Stop reason: SDUPTHER

## 2023-10-20 RX ORDER — INSULIN GLARGINE 100 [IU]/ML
75 INJECTION, SOLUTION SUBCUTANEOUS EVERY EVENING
Qty: 30 ML | Refills: 3 | Status: SHIPPED | OUTPATIENT
Start: 2023-10-20 | End: 2024-03-04 | Stop reason: SDUPTHER

## 2023-10-20 ASSESSMENT — ENCOUNTER SYMPTOMS
SLEEP DISTURBANCE: 0
DIARRHEA: 0
NAUSEA: 0
CONSTIPATION: 0
ACTIVITY CHANGE: 0
FATIGUE: 0
DIZZINESS: 0
WEAKNESS: 0
NUMBNESS: 0
POLYPHAGIA: 0
SHORTNESS OF BREATH: 0
APPETITE CHANGE: 0
SEIZURES: 0
NERVOUS/ANXIOUS: 0
FREQUENCY: 0
POLYDIPSIA: 0
PALPITATIONS: 0

## 2023-10-20 NOTE — PROGRESS NOTES
"Subjective   Li Valdovinos is a 73 y.o. female who presents for follow-up of Type 2 diabetes mellitus. The initial diagnosis of diabetes was made  Age 45 . The patient does have a known family history of diabetes father and paternal uncle, maternal cousins.    Known complications due to diabetes included none    Cardiovascular risk factors include advanced age (older than 55 for men, 65 for women), diabetes mellitus, dyslipidemia, and hypertension. The patient is on an ACE inhibitor or angiotensin II receptor blocker.      Current diabetes regimen is as follows:   Lantus 74 units at night  Humalog up to 150 units a day  Breakfast 15-20 Units  Lunch 35-40 Units  Dinner 60 Units      The patient is currently checking the blood glucose 8 times per day.  Patient is using: continuous glucose monitor    Hypoglycemia frequency: <1%  Hypoglycemia awareness: Yes     Exercise: daily   Meal panning: She is using avoidance of concentrated sweets.    Review of Systems   Constitutional:  Negative for activity change, appetite change and fatigue.   Respiratory:  Negative for shortness of breath.    Cardiovascular:  Negative for chest pain, palpitations and leg swelling.   Gastrointestinal:  Negative for constipation, diarrhea and nausea.   Endocrine: Negative for cold intolerance, heat intolerance, polydipsia, polyphagia and polyuria.   Genitourinary:  Negative for frequency.   Musculoskeletal:  Negative for gait problem.   Skin:  Negative for rash.   Neurological:  Negative for dizziness, seizures, weakness and numbness.   Psychiatric/Behavioral:  Negative for sleep disturbance and suicidal ideas. The patient is not nervous/anxious.        Objective   /67 (BP Location: Right arm, Patient Position: Sitting, BP Cuff Size: Adult)   Pulse 96   Ht 1.651 m (5' 5\")   Wt 79.8 kg (176 lb)   BMI 29.29 kg/m²   Physical Exam  Constitutional:       Appearance: Normal appearance.   Pulmonary:      Effort: Pulmonary effort is " normal.   Skin:     General: Skin is warm and dry.   Neurological:      Mental Status: She is alert and oriented to person, place, and time.   Psychiatric:         Mood and Affect: Mood normal.         Behavior: Behavior normal.         Thought Content: Thought content normal.         Judgment: Judgment normal.         Lab Review  Glucose (mg/dL)   Date Value   10/16/2023 132 (H)   06/20/2023 105 (H)   04/21/2022 98   11/24/2020 116 (H)     POC HEMOGLOBIN A1c (%)   Date Value   10/20/2023 6.6 (A)     Hemoglobin A1C (%)   Date Value   06/20/2023 7.3 (A)   04/21/2022 7.5 (A)   06/09/2020 6.3     Bicarbonate (mmol/L)   Date Value   10/16/2023 24   06/20/2023 25   04/21/2022 28   11/24/2020 22     Urea Nitrogen (mg/dL)   Date Value   10/16/2023 34 (H)   06/20/2023 53 (H)   04/21/2022 30 (H)   11/24/2020 34 (H)     Creatinine (mg/dL)   Date Value   10/16/2023 2.32 (H)   06/20/2023 2.55 (H)   04/21/2022 2.08 (H)   11/24/2020 2.09 (H)         Health Maintenance:   Foot Exam: July 2023, denies issues  Eye Exam:   Lipid Panel:  Urine Albumin:    Assessment/Plan   Diagnoses and all orders for this visit:  Type 2 diabetes mellitus with hyperglycemia, unspecified whether long term insulin use (CMS/[facility])  -     POCT glycosylated hemoglobin (Hb A1C) manually resulted    Type 2 diabetes mellitus, is at goal. A1c 6.6% at today's appointment    RX changes:   Continue Lantus 74 units  Humalog up to 150 units a day  Breakfast 15-20 Units  Lunch 35-40 Units  Dinner 60 Units INCREASE by 5 units, especially at large meals.   Hypertension: At goal. No changes.  Hyperlipidemia: At goal. No changes   Stage 4 CKD GFR 22. She was provided Jardiance and does not want to take due to side effect profile.   Education:  interpretation of lab results, CGM download.   Follow up: I recommend diabetes care be 6 months.

## 2023-10-20 NOTE — PATIENT INSTRUCTIONS
Type 2 diabetes mellitus, is at goal. A1c 6.6% at today's appointment    RX changes:   Continue Lantus 74 units  Humalog up to 150 units a day  Breakfast 15-20 Units  Lunch 35-40 Units  Dinner 60 Units INCREASE by 5 units, especially at large meals.   Hypertension: At goal. No changes.  Hyperlipidemia: At goal. No changes   Stage 4 CKD GFR 22. She was provided Jardiance and does not want to take due to side effect profile.   Education:  interpretation of lab results, CGM download.   Follow up: I recommend diabetes care be 3 months.

## 2023-11-21 ENCOUNTER — APPOINTMENT (OUTPATIENT)
Dept: PRIMARY CARE | Facility: CLINIC | Age: 73
End: 2023-11-21
Payer: MEDICARE

## 2024-01-31 DIAGNOSIS — N18.4 CKD STAGE 4 DUE TO TYPE 2 DIABETES MELLITUS (MULTI): Primary | ICD-10-CM

## 2024-01-31 DIAGNOSIS — E11.22 CKD STAGE 4 DUE TO TYPE 2 DIABETES MELLITUS (MULTI): Primary | ICD-10-CM

## 2024-02-02 ENCOUNTER — LAB (OUTPATIENT)
Dept: LAB | Facility: LAB | Age: 74
End: 2024-02-02
Payer: MEDICARE

## 2024-02-02 DIAGNOSIS — E11.22 CKD STAGE 4 DUE TO TYPE 2 DIABETES MELLITUS (MULTI): ICD-10-CM

## 2024-02-02 DIAGNOSIS — N18.4 CKD STAGE 4 DUE TO TYPE 2 DIABETES MELLITUS (MULTI): ICD-10-CM

## 2024-02-02 LAB
ALBUMIN SERPL BCP-MCNC: 4 G/DL (ref 3.4–5)
ANION GAP SERPL CALC-SCNC: 13 MMOL/L (ref 10–20)
BUN SERPL-MCNC: 24 MG/DL (ref 6–23)
CALCIUM SERPL-MCNC: 9.8 MG/DL (ref 8.6–10.6)
CHLORIDE SERPL-SCNC: 106 MMOL/L (ref 98–107)
CO2 SERPL-SCNC: 25 MMOL/L (ref 21–32)
CREAT SERPL-MCNC: 2.17 MG/DL (ref 0.5–1.05)
CREAT UR-MCNC: 53.4 MG/DL (ref 20–320)
EGFRCR SERPLBLD CKD-EPI 2021: 24 ML/MIN/1.73M*2
GLUCOSE SERPL-MCNC: 107 MG/DL (ref 74–99)
MICROALBUMIN UR-MCNC: <7 MG/L
MICROALBUMIN/CREAT UR: NORMAL MG/G{CREAT}
PHOSPHATE SERPL-MCNC: 3.3 MG/DL (ref 2.5–4.9)
POTASSIUM SERPL-SCNC: 4.6 MMOL/L (ref 3.5–5.3)
PTH-INTACT SERPL-MCNC: 52.7 PG/ML (ref 18.5–88)
SODIUM SERPL-SCNC: 139 MMOL/L (ref 136–145)

## 2024-02-02 PROCEDURE — 82043 UR ALBUMIN QUANTITATIVE: CPT

## 2024-02-02 PROCEDURE — 36415 COLL VENOUS BLD VENIPUNCTURE: CPT

## 2024-02-02 PROCEDURE — 80069 RENAL FUNCTION PANEL: CPT

## 2024-02-02 PROCEDURE — 82570 ASSAY OF URINE CREATININE: CPT

## 2024-02-02 PROCEDURE — 83970 ASSAY OF PARATHORMONE: CPT

## 2024-02-05 ENCOUNTER — OFFICE VISIT (OUTPATIENT)
Dept: NEPHROLOGY | Facility: CLINIC | Age: 74
End: 2024-02-05
Payer: MEDICARE

## 2024-02-05 VITALS
BODY MASS INDEX: 30.49 KG/M2 | WEIGHT: 183 LBS | HEIGHT: 65 IN | HEART RATE: 68 BPM | DIASTOLIC BLOOD PRESSURE: 81 MMHG | SYSTOLIC BLOOD PRESSURE: 123 MMHG

## 2024-02-05 DIAGNOSIS — N18.4 CKD STAGE 4 DUE TO TYPE 2 DIABETES MELLITUS (MULTI): Primary | ICD-10-CM

## 2024-02-05 DIAGNOSIS — E11.22 CKD STAGE 4 DUE TO TYPE 2 DIABETES MELLITUS (MULTI): Primary | ICD-10-CM

## 2024-02-05 PROCEDURE — 3066F NEPHROPATHY DOC TX: CPT | Performed by: INTERNAL MEDICINE

## 2024-02-05 PROCEDURE — 1036F TOBACCO NON-USER: CPT | Performed by: INTERNAL MEDICINE

## 2024-02-05 PROCEDURE — 1126F AMNT PAIN NOTED NONE PRSNT: CPT | Performed by: INTERNAL MEDICINE

## 2024-02-05 PROCEDURE — 4010F ACE/ARB THERAPY RXD/TAKEN: CPT | Performed by: INTERNAL MEDICINE

## 2024-02-05 PROCEDURE — 3079F DIAST BP 80-89 MM HG: CPT | Performed by: INTERNAL MEDICINE

## 2024-02-05 PROCEDURE — 3062F POS MACROALBUMINURIA REV: CPT | Performed by: INTERNAL MEDICINE

## 2024-02-05 PROCEDURE — 3074F SYST BP LT 130 MM HG: CPT | Performed by: INTERNAL MEDICINE

## 2024-02-05 PROCEDURE — 99214 OFFICE O/P EST MOD 30 MIN: CPT | Performed by: INTERNAL MEDICINE

## 2024-02-05 ASSESSMENT — PAIN SCALES - GENERAL: PAINLEVEL: 0-NO PAIN

## 2024-02-05 NOTE — PROGRESS NOTES
For follow up, doing well.  No complaints  No hospitalizations/illness since last visit  BP at home in 130s/80s  Denies orthostatic symptoms  Did not start SGLT2i and does not want to start it due to concern for side effects.    RoS negative for all other systems except as noted above.    Vitals:    02/05/24 1313   BP: 123/81   Pulse: 68     No distress  HEENT:  moist, no pallor  No edema anabella LE  Breath sounds anabella equal, clear  S1 S2 regular, normal, no rub or murmur  Abdomen soft  AAO x3, non focal    Sodium   Date/Time Value Ref Range Status   02/02/2024 08:23  136 - 145 mmol/L Final   10/16/2023 03:16  136 - 145 mmol/L Final   06/20/2023 09:17  136 - 145 mmol/L Final   04/21/2022 10:48  136 - 145 mmol/L Final   11/24/2020 03:13  136 - 145 mmol/L Final     Potassium   Date/Time Value Ref Range Status   02/02/2024 08:23 AM 4.6 3.5 - 5.3 mmol/L Final   10/16/2023 03:16 PM 4.2 3.5 - 5.3 mmol/L Final   06/20/2023 09:17 AM 4.8 3.5 - 5.3 mmol/L Final   04/21/2022 10:48 AM 4.6 3.5 - 5.3 mmol/L Final   11/24/2020 03:13 PM 4.3 3.5 - 5.3 mmol/L Final     Chloride   Date/Time Value Ref Range Status   02/02/2024 08:23  98 - 107 mmol/L Final   10/16/2023 03:16  98 - 107 mmol/L Final   06/20/2023 09:17  98 - 107 mmol/L Final   04/21/2022 10:48  98 - 107 mmol/L Final   11/24/2020 03:13  98 - 107 mmol/L Final     Urea Nitrogen   Date/Time Value Ref Range Status   02/02/2024 08:23 AM 24 (H) 6 - 23 mg/dL Final   10/16/2023 03:16 PM 34 (H) 6 - 23 mg/dL Final   06/20/2023 09:17 AM 53 (H) 6 - 23 mg/dL Final   04/21/2022 10:48 AM 30 (H) 6 - 23 mg/dL Final   11/24/2020 03:13 PM 34 (H) 6 - 23 mg/dL Final     Creatinine   Date/Time Value Ref Range Status   02/02/2024 08:23 AM 2.17 (H) 0.50 - 1.05 mg/dL Final   10/16/2023 03:16 PM 2.32 (H) 0.50 - 1.05 mg/dL Final   06/20/2023 09:17 AM 2.55 (H) 0.50 - 1.05 mg/dL Final   04/21/2022 10:48 AM 2.08 (H) 0.50 - 1.05 mg/dL Final    11/24/2020 03:13 PM 2.09 (H) 0.50 - 1.05 mg/dL Final     GFR Female   Date/Time Value Ref Range Status   06/20/2023 09:17 AM 19 (A) >90 mL/min/1.73m2 Final     Comment:      CALCULATIONS OF ESTIMATED GFR ARE PERFORMED   USING THE 2021 CKD-EPI STUDY REFIT EQUATION   WITHOUT THE RACE VARIABLE FOR THE IDMS-TRACEABLE   CREATININE METHODS.    https://jasn.asnjournals.org/content/early/2021/09/22/ASN.[email or web address]   04/21/2022 10:48 AM 25 (A) >90 mL/min/1.73m2 Final     Comment:      CALCULATIONS OF ESTIMATED GFR ARE PERFORMED   USING THE 2021 CKD-EPI STUDY REFIT EQUATION   WITHOUT THE RACE VARIABLE FOR THE IDMS-TRACEABLE   CREATININE METHODS.    https://jasn.asnjournals.org/content/early/2021/09/22/[email or web address]       eGFR   Date/Time Value Ref Range Status   02/02/2024 08:23 AM 24 (L) >60 mL/min/1.73m*2 Final     Comment:     Calculations of estimated GFR are performed using the 2021 CKD-EPI Study Refit equation without the race variable for the IDMS-Traceable creatinine methods.  https://jasn.asnjournals.org/content/early/2021/09/22/ASN.[email or web address]   10/16/2023 03:16 PM 22 (L) >60 mL/min/1.73m*2 Final     Comment:     Calculations of estimated GFR are performed using the 2021 CKD-EPI Study Refit equation without the race variable for the IDMS-Traceable creatinine methods.  https://jasn.asnjournals.org/content/early/2021/09/22/ASN.[email or web address]     Calcium   Date/Time Value Ref Range Status   02/02/2024 08:23 AM 9.8 8.6 - 10.6 mg/dL Final   10/16/2023 03:16 PM 10.0 8.6 - 10.6 mg/dL Final   06/20/2023 09:17 AM 9.7 8.6 - 10.6 mg/dL Final   04/21/2022 10:48 AM 9.5 8.6 - 10.3 mg/dL Final   11/24/2020 03:13 PM 9.3 8.6 - 10.6 mg/dL Final     Phosphorus   Date/Time Value Ref Range Status   02/02/2024 08:23 AM 3.3 2.5 - 4.9 mg/dL Final     Comment:     The performance characteristics of phosphorus testing in heparinized plasma have been validated by the individual  laboratory site where testing is performed. Testing on  heparinized plasma is not approved by the FDA; however, such approval is not necessary.   10/16/2023 03:16 PM 2.8 2.5 - 4.9 mg/dL Final     Comment:     The performance characteristics of phosphorus testing in heparinized plasma have been validated by the individual  laboratory site where testing is performed. Testing on heparinized plasma is not approved by the FDA; however, such approval is not necessary.   08/18/2020 08:30 AM 4.4 2.5 - 4.9 mg/dL Final     Comment:      The performance characteristics of phosphorus testing in   heparinized plasma have been validated by the individual     laboratory site where testing is performed. Testing    on heparinized plasma is not approved by the FDA;    however, such approval is not necessary.       Parathyroid Hormone, Intact   Date/Time Value Ref Range Status   02/02/2024 08:23 AM 52.7 18.5 - 88.0 pg/mL Final   10/16/2023 03:16 PM 84.0 18.5 - 88.0 pg/mL Final     Vitamin D, 25-Hydroxy, Total   Date/Time Value Ref Range Status   10/16/2023 03:16 PM 40 30 - 100 ng/mL Final     Vitamin D, 25-Hydroxy   Date/Time Value Ref Range Status   06/09/2020 09:09 AM 38 ng/mL Final     Comment:     .  DEFICIENCY:         < 20   NG/ML  INSUFFICIENCY:      20-29  NG/ML  SUFFICIENCY:         NG/ML    THIS ASSAY ACCURATELY QUANTIFIES THE SUM OF  VITAMIN D3, 25-HYDROXY AND VIT D2,25-HYDROXY.     12/04/2018 03:24 PM 48 ng/mL Final     Comment:     .  DEFICIENCY:         < 20  NG/ML  INSUFFICIENCY:      20-29 NG/ML  OPTIMUM LEVEL:      30-80 NG/ML  POSSIBLE TOXICITY:  > 80  NG/ML    THIS ASSAY ACCURATELY QUANTIFIES THE SUM OF  VITAMIN D3, 25-HYDROXY AND VIT D2,25-HYDROXY.     08/13/2018 10:29 AM 29 (A) ng/mL Final     Comment:     .  DEFICIENCY:         < 20  NG/ML  INSUFFICIENCY:      20-29 NG/ML  OPTIMUM LEVEL:      30-80 NG/ML  POSSIBLE TOXICITY:  > 80  NG/ML    THIS ASSAY ACCURATELY QUANTIFIES THE SUM OF  VITAMIN D3, 25-HYDROXY AND VIT D2,25-HYDROXY.       Albumin/Creatine Ratio  "  Date/Time Value Ref Range Status   02/02/2024 08:30 AM   Final     Comment:     One or more analytes used in this calculation is outside of the analytical measurement range. Calculation cannot be performed.   10/16/2023 03:16 PM   Final     Comment:     One or more analytes used in this calculation is outside of the analytical measurement range. Calculation cannot be performed.   08/18/2020 08:30 AM SEE COMMENT 0.0 - 30.0 ug/mg crt Final     Comment:     One or more analytes used in this calculation   is outside of the analytical measurement range.  Calculation cannot be performed.       Hemoglobin   Date/Time Value Ref Range Status   10/16/2023 03:16 PM 11.9 (L) 12.0 - 16.0 g/dL Final   06/20/2023 09:17 AM 10.6 (L) 12.0 - 16.0 g/dL Final   04/21/2022 10:48 AM 11.6 (L) 12.0 - 16.0 g/dL Final   11/24/2020 03:13 PM 10.5 (L) 12.0 - 16.0 g/dL Final     Current Outpatient Medications   Medication Instructions    allopurinol (ZYLOPRIM) 50 mg, oral, Every other day    alpha tocopherol (VITAMIN E) 400 Units, oral, Daily    APPLE CIDER VINEGAR ORAL 1 tablet, oral, Daily,  Uses gummies    blood sugar diagnostic (ONETOUCH ULTRA BLUE TEST STRIP MISC) Use to monitor glucose level four times a day    blood-glucose sensor (DEXCOM G6 SENSOR MISC) miscellaneous, Use as directed for continuous glucose monitoring. Change every 10 days    blood-glucose transmitter (DEXCOM G4 TRANSMITTER MISC) miscellaneous, Use as directed for continous glucose monitoring change every 10 days     cholecalciferol (Vitamin D-3) 10 MCG (400 UNIT) tablet oral    coenzyme Q10 400 mg capsule Take 1 capsule (400 mg) by mouth once daily.    insulin glargine (LANTUS) 75 Units, subcutaneous, Every evening    insulin lispro (HumaLOG KwikPen Insulin) 200 unit/mL (3 mL) insulin pen pen per 24 hours to cover meals    insulin syringe-needle U-100 1 mL 31 gauge x 15/64\" syringe miscellaneous, Use and discard 1 syringe daily    Lactobacillus acidophilus (PROBIOTIC " "ORAL) Use as directed    lisinopril 20 mg, oral, Daily    lutein 20 mg capsule TAKE AS DIRECTED.    magnesium oxide (MAG-OX) 250 mg, oral, Daily    metoprolol tartrate (LOPRESSOR) 12.5 mg, oral, Daily    OneTouch Ultra Test strip In Vitro Strip - Use to monitor glucose level four times a day    pen needle, diabetic 32 gauge x 5/32\" needle Use 3 daily    predniSONE (Deltasone) 20 mg tablet  TAKE 1 TABLET BY MOUTH TWICE DAILY FOR 1 DAY. THEN TAKE 1/2 PILLL 2 TIMES A DAY FOR 5 DAYS<BR>    rosuvastatin (Crestor) 10 mg tablet 1 tablet, oral, Daily    s-adenosylmethionine (LAILA-e, Enteric Coated,) 400 mg tablet,delayed release (DR/EC) 1 tablet, oral, Daily    TURMERIC ORAL Turmeric Curcumin Oral Capsule   Refills: 0        Start : 24-Jun-2021   Active    VITAMIN B COMPLEX ORAL 1 tablet, oral, Daily, Super B-Complex    vitamin E 180 mg (400 unit) capsule Vitamin E 400 UNIT Oral Capsule   Refills: 0        Start : 22-Mar-2018   Active      73-year-old with longstanding history of diabetes, hypertension, substantial NSAID use in the past with chronic kidney disease for follow-up.     #CKD G4 A1: Exact etiology unclear, possibly nonproteinuric diabetic kidney disease or hypertensive kidney disease with worsening from chronic NSAID use in the past. Creatinine 2.1 mg per DL with EGFR 24 mL/min on 2/2/24,  stable.  I discussed with her again the benefits and risks of SGLT2 inhibitors and ways of mitigating some of the risks such as urinary tract infections and urged her to reconsider SGLT2i as that is her best medication for slowing progression of CKD. Continue to avoid NSAIDs.  Continue 0.6 g/kg/day protein limited, predominantly plant source diet.     #Hypertension: Goal blood pressure 120/80 mmHg, at goal.  Continue metoprolol 12.5 and lisinopril 20 mg/day.  2.5 g/day sodium restricted diet.     RTC: 3 months    "

## 2024-02-08 ENCOUNTER — TELEPHONE (OUTPATIENT)
Dept: PRIMARY CARE | Facility: CLINIC | Age: 74
End: 2024-02-08
Payer: MEDICARE

## 2024-02-08 DIAGNOSIS — M1A.9XX0 CHRONIC GOUT WITHOUT TOPHUS, UNSPECIFIED CAUSE, UNSPECIFIED SITE: ICD-10-CM

## 2024-02-08 NOTE — TELEPHONE ENCOUNTER
Patient requesting refill on her Allopurinol 100mg, takes 1/2 tablet every other day to [facility] pharmacy  Last OV 10/9/23  Please advise

## 2024-02-09 RX ORDER — ALLOPURINOL 100 MG/1
50 TABLET ORAL EVERY OTHER DAY
Qty: 23 TABLET | Refills: 0 | Status: SHIPPED | OUTPATIENT
Start: 2024-02-09 | End: 2024-06-16

## 2024-03-04 DIAGNOSIS — E11.65 TYPE 2 DIABETES MELLITUS WITH HYPERGLYCEMIA, UNSPECIFIED WHETHER LONG TERM INSULIN USE (MULTI): ICD-10-CM

## 2024-03-04 DIAGNOSIS — N18.4 CKD (CHRONIC KIDNEY DISEASE) STAGE 4, GFR 15-29 ML/MIN (MULTI): ICD-10-CM

## 2024-03-04 DIAGNOSIS — I10 PRIMARY HYPERTENSION: ICD-10-CM

## 2024-03-04 RX ORDER — METOPROLOL TARTRATE 25 MG/1
12.5 TABLET, FILM COATED ORAL DAILY
Qty: 45 TABLET | Refills: 0 | Status: SHIPPED | OUTPATIENT
Start: 2024-03-04 | End: 2025-03-04

## 2024-03-04 RX ORDER — INSULIN LISPRO 200 [IU]/ML
INJECTION, SOLUTION SUBCUTANEOUS
Qty: 45 ML | Refills: 0 | Status: SHIPPED | OUTPATIENT
Start: 2024-03-04 | End: 2024-04-16 | Stop reason: SDUPTHER

## 2024-03-04 RX ORDER — INSULIN GLARGINE 100 [IU]/ML
75 INJECTION, SOLUTION SUBCUTANEOUS EVERY EVENING
Qty: 75 ML | Refills: 0 | Status: SHIPPED | OUTPATIENT
Start: 2024-03-04 | End: 2024-04-16 | Stop reason: SDUPTHER

## 2024-03-04 RX ORDER — LISINOPRIL 20 MG/1
20 TABLET ORAL DAILY
Qty: 90 TABLET | Refills: 0 | Status: SHIPPED | OUTPATIENT
Start: 2024-03-04 | End: 2025-03-04

## 2024-03-04 RX ORDER — PEN NEEDLE, DIABETIC 30 GX3/16"
360 NEEDLE, DISPOSABLE MISCELLANEOUS 3 TIMES DAILY
Qty: 300 EACH | Refills: 0 | Status: SHIPPED | OUTPATIENT
Start: 2024-03-04 | End: 2024-04-16 | Stop reason: SDUPTHER

## 2024-03-04 RX ORDER — SYRINGE AND NEEDLE,INSULIN,1ML 31GX15/64"
90 SYRINGE, EMPTY DISPOSABLE MISCELLANEOUS DAILY
Qty: 100 EACH | Refills: 0 | Status: SHIPPED | OUTPATIENT
Start: 2024-03-04 | End: 2024-04-16 | Stop reason: SDUPTHER

## 2024-03-04 NOTE — TELEPHONE ENCOUNTER
"Orders Placed This Encounter      insulin glargine (Lantus) 100 unit/mL injection      insulin lispro (HumaLOG KwikPen Insulin) 200 unit/mL (3 mL) insulin pen pen      insulin syringe-needle U-100 1 mL 31 gauge x 15/64\" syringe      lisinopril 20 mg tablet      metoprolol tartrate (Lopressor) 25 mg tablet      pen needle, diabetic 32 gauge x 5/32\" needle      "

## 2024-03-04 NOTE — TELEPHONE ENCOUNTER
"Requested Prescriptions     Pending Prescriptions Disp Refills    insulin lispro (HumaLOG KwikPen Insulin) 200 unit/mL (3 mL) insulin pen pen 400 mL 3     Sig: Per sliding scale at mealtimes    insulin glargine (Lantus) 100 unit/mL injection 30 mL 3     Sig: Inject 75 Units under the skin once daily in the evening.    lisinopril 20 mg tablet 90 tablet 3     Sig: Take 1 tablet (20 mg) by mouth once daily.    metoprolol tartrate (Lopressor) 25 mg tablet 45 tablet 3     Sig: Take 0.5 tablets (12.5 mg) by mouth once daily.    pen needle, diabetic 32 gauge x 5/32\" needle 90 each 3     Si each 3 times a day. Use 3 daily    insulin syringe-needle U-100 1 mL 31 gauge x 15/64\" syringe 90 each 3     Si each once daily. Use and discard 1 syringe daily     Last office visit:  10/20/23  Next office visit:  24    "

## 2024-03-15 ENCOUNTER — HOSPITAL ENCOUNTER (OUTPATIENT)
Dept: RADIOLOGY | Facility: EXTERNAL LOCATION | Age: 74
Discharge: HOME | End: 2024-03-15
Payer: MEDICARE

## 2024-03-15 DIAGNOSIS — M25.521 RIGHT ELBOW PAIN: ICD-10-CM

## 2024-04-16 ENCOUNTER — OFFICE VISIT (OUTPATIENT)
Dept: ENDOCRINOLOGY | Facility: CLINIC | Age: 74
End: 2024-04-16
Payer: MEDICARE

## 2024-04-16 ENCOUNTER — TELEPHONE (OUTPATIENT)
Dept: ENDOCRINOLOGY | Facility: CLINIC | Age: 74
End: 2024-04-16

## 2024-04-16 VITALS
RESPIRATION RATE: 22 BRPM | HEIGHT: 65 IN | SYSTOLIC BLOOD PRESSURE: 124 MMHG | WEIGHT: 179.9 LBS | HEART RATE: 87 BPM | DIASTOLIC BLOOD PRESSURE: 69 MMHG | TEMPERATURE: 98 F | BODY MASS INDEX: 29.97 KG/M2

## 2024-04-16 DIAGNOSIS — E11.22 CKD STAGE 4 DUE TO TYPE 2 DIABETES MELLITUS (MULTI): ICD-10-CM

## 2024-04-16 DIAGNOSIS — I10 PRIMARY HYPERTENSION: ICD-10-CM

## 2024-04-16 DIAGNOSIS — E78.2 MIXED HYPERLIPIDEMIA: ICD-10-CM

## 2024-04-16 DIAGNOSIS — N18.4 CKD STAGE 4 DUE TO TYPE 2 DIABETES MELLITUS (MULTI): ICD-10-CM

## 2024-04-16 DIAGNOSIS — I25.10 ATHEROSCLEROSIS OF CORONARY ARTERY OF NATIVE HEART WITHOUT ANGINA PECTORIS, UNSPECIFIED VESSEL OR LESION TYPE: ICD-10-CM

## 2024-04-16 DIAGNOSIS — E11.65 TYPE 2 DIABETES MELLITUS WITH HYPERGLYCEMIA, UNSPECIFIED WHETHER LONG TERM INSULIN USE (MULTI): Primary | ICD-10-CM

## 2024-04-16 LAB — POC HEMOGLOBIN A1C: 7.1 % (ref 4.2–6.5)

## 2024-04-16 PROCEDURE — 3074F SYST BP LT 130 MM HG: CPT | Performed by: NURSE PRACTITIONER

## 2024-04-16 PROCEDURE — 3062F POS MACROALBUMINURIA REV: CPT | Performed by: NURSE PRACTITIONER

## 2024-04-16 PROCEDURE — 1159F MED LIST DOCD IN RCRD: CPT | Performed by: NURSE PRACTITIONER

## 2024-04-16 PROCEDURE — 3078F DIAST BP <80 MM HG: CPT | Performed by: NURSE PRACTITIONER

## 2024-04-16 PROCEDURE — 4010F ACE/ARB THERAPY RXD/TAKEN: CPT | Performed by: NURSE PRACTITIONER

## 2024-04-16 PROCEDURE — 95251 CONT GLUC MNTR ANALYSIS I&R: CPT | Performed by: NURSE PRACTITIONER

## 2024-04-16 PROCEDURE — 99214 OFFICE O/P EST MOD 30 MIN: CPT | Performed by: NURSE PRACTITIONER

## 2024-04-16 PROCEDURE — 1036F TOBACCO NON-USER: CPT | Performed by: NURSE PRACTITIONER

## 2024-04-16 PROCEDURE — 83036 HEMOGLOBIN GLYCOSYLATED A1C: CPT | Performed by: NURSE PRACTITIONER

## 2024-04-16 RX ORDER — PEN NEEDLE, DIABETIC 30 GX3/16"
360 NEEDLE, DISPOSABLE MISCELLANEOUS 3 TIMES DAILY
Qty: 300 EACH | Refills: 0 | Status: SHIPPED | OUTPATIENT
Start: 2024-04-16 | End: 2024-07-15

## 2024-04-16 RX ORDER — INSULIN GLARGINE 100 [IU]/ML
INJECTION, SOLUTION SUBCUTANEOUS
Qty: 90 ML | Refills: 3 | Status: SHIPPED | OUTPATIENT
Start: 2024-04-16

## 2024-04-16 RX ORDER — SYRINGE AND NEEDLE,INSULIN,1ML 31GX15/64"
90 SYRINGE, EMPTY DISPOSABLE MISCELLANEOUS DAILY
Qty: 100 EACH | Refills: 0 | Status: SHIPPED | OUTPATIENT
Start: 2024-04-16

## 2024-04-16 RX ORDER — INSULIN LISPRO 200 [IU]/ML
INJECTION, SOLUTION SUBCUTANEOUS
Qty: 66 ML | Refills: 3 | Status: SHIPPED | OUTPATIENT
Start: 2024-04-16

## 2024-04-16 ASSESSMENT — ENCOUNTER SYMPTOMS
NAUSEA: 0
FATIGUE: 0
NERVOUS/ANXIOUS: 0
FREQUENCY: 0
POLYDIPSIA: 0
SLEEP DISTURBANCE: 0
ACTIVITY CHANGE: 0
NUMBNESS: 0
POLYPHAGIA: 0
SEIZURES: 0
PALPITATIONS: 0
WEAKNESS: 0
SHORTNESS OF BREATH: 0
DIZZINESS: 0
DIARRHEA: 0
CONSTIPATION: 0
APPETITE CHANGE: 0

## 2024-04-16 NOTE — PROGRESS NOTES
Subjective   Li Valdovinos is a 73 y.o. female who presents for follow-up of Type 2 diabetes mellitus. The initial diagnosis of diabetes was made  Age 45 . The patient does have a known family history of diabetes father and paternal uncle, maternal cousins.    She was diagnosed with Covid last week, states she had fatigue.  was hospitalized with it for a week.     Known complications due to diabetes included none    Cardiovascular risk factors include advanced age (older than 55 for men, 65 for women), diabetes mellitus, dyslipidemia, and hypertension. The patient is on an ACE inhibitor or angiotensin II receptor blocker.      Current diabetes regimen is as follows:   Continue Lantus 74 units  Humalog up to 150 units a day  Breakfast 15-20 Units  Lunch 35-40 Units  Dinner 60 Units INCREASE by 5 units, especially at large meals.     The patient is currently checking the blood glucose 8 times per day.  Patient is using: continuous glucose monitor    Hypoglycemia frequency: <1%  Hypoglycemia awareness: Yes     Exercise: daily   Meal panning: She is using avoidance of concentrated sweets.    Review of Systems   Constitutional:  Negative for activity change, appetite change and fatigue.   Respiratory:  Negative for shortness of breath.    Cardiovascular:  Negative for chest pain, palpitations and leg swelling.   Gastrointestinal:  Negative for constipation, diarrhea and nausea.   Endocrine: Negative for cold intolerance, heat intolerance, polydipsia, polyphagia and polyuria.   Genitourinary:  Negative for frequency.   Musculoskeletal:  Negative for gait problem.   Skin:  Negative for rash.   Neurological:  Negative for dizziness, seizures, weakness and numbness.   Psychiatric/Behavioral:  Negative for sleep disturbance and suicidal ideas. The patient is not nervous/anxious.        Objective   /69 (BP Location: Left arm, Patient Position: Sitting, BP Cuff Size: Adult)   Pulse 87   Temp 36.7 °C (98 °F)  "(Tympanic)   Resp 22   Ht 1.651 m (5' 5\")   Wt 81.6 kg (179 lb 14.4 oz)   BMI 29.94 kg/m²   Physical Exam  Constitutional:       Appearance: Normal appearance.   Pulmonary:      Effort: Pulmonary effort is normal.   Skin:     General: Skin is warm and dry.   Neurological:      Mental Status: She is alert and oriented to person, place, and time.   Psychiatric:         Mood and Affect: Mood normal.         Behavior: Behavior normal.         Thought Content: Thought content normal.         Judgment: Judgment normal.         Downloaded and interrogated Dexcom CGM; Reviewed last 2 weeks of data to make treatment decisions.   Average Glucose 159 mg/dL  GMI 7.1%  Coefficient of Variation  30.2%  Time CGM Active 92.5%  Lab Review  Glucose (mg/dL)   Date Value   02/02/2024 107 (H)   10/16/2023 132 (H)   06/20/2023 105 (H)   04/21/2022 98   11/24/2020 116 (H)     POC HEMOGLOBIN A1c (%)   Date Value   10/20/2023 6.6 (A)     Hemoglobin A1C (%)   Date Value   06/20/2023 7.3 (A)   04/21/2022 7.5 (A)   06/09/2020 6.3     Bicarbonate (mmol/L)   Date Value   02/02/2024 25   10/16/2023 24   06/20/2023 25   04/21/2022 28   11/24/2020 22     Urea Nitrogen (mg/dL)   Date Value   02/02/2024 24 (H)   10/16/2023 34 (H)   06/20/2023 53 (H)   04/21/2022 30 (H)   11/24/2020 34 (H)     Creatinine (mg/dL)   Date Value   02/02/2024 2.17 (H)   10/16/2023 2.32 (H)   06/20/2023 2.55 (H)   04/21/2022 2.08 (H)   11/24/2020 2.09 (H)       Health Maintenance:   Foot Exam: July 2023, denies issues  Eye Exam: Jan 2024. Denies retinopathy.   Lipid Panel: Total 122, HDL 64, LDL 46 June 2023  GFR 22: She saw nephrologist in Feb 2024 with GFR 22. Was encouraged to take SGT2i.     Assessment/Plan   Diagnoses and all orders for this visit:  Type 2 diabetes mellitus with hyperglycemia, unspecified whether long term insulin use (Multi)  CKD stage 4 due to type 2 diabetes mellitus (Multi)  Atherosclerosis of coronary artery of native heart without angina " pectoris, unspecified vessel or lesion type  Primary hypertension  Mixed hyperlipidemia    Type 2 diabetes mellitus, is at goal. A1c 7.1% at today's appointment  RX changes:   REDUCE Lantus 66 units  Humalog up to 150 units a day  Breakfast 15-20 Units  Lunch 35-40 Units  Dinner 64 Units INCREASE by 6 units, especially at large meals.   START JARDIANCE 10 mg tablet daily  Hypertension: At goal. No changes.  Hyperlipidemia: At goal. No changes   Stage 4 CKD GFR 22. We have initiated Jardiance 10 mg today as her nephrologist has suggested she do so for renal protection. Samples provided.   Education:  interpretation of lab results, CGM download.   Follow up: I recommend diabetes care be 6 months.

## 2024-04-16 NOTE — PATIENT INSTRUCTIONS
REDUCE Lantus 66 units  Humalog up to 150 units a day  Breakfast 15-20 Units  Lunch 35-40 Units  Dinner 64 Units INCREASE by 6 units, especially at large meals.   START JARDIANCE 10 mg tablet daily  Hypertension: At goal. No changes.  Hyperlipidemia: At goal. No changes   Stage 4 CKD GFR 22. We have initiated Jardiance 10 mg today as her nephrologist has suggested she do so for renal protection. Samples provided.   Education:  interpretation of lab results, CGM download.   Follow up: I recommend diabetes care be 6 months.

## 2024-04-25 ENCOUNTER — APPOINTMENT (OUTPATIENT)
Dept: ENDOCRINOLOGY | Facility: CLINIC | Age: 74
End: 2024-04-25
Payer: MEDICARE

## 2024-05-23 ENCOUNTER — APPOINTMENT (OUTPATIENT)
Dept: RADIOLOGY | Facility: CLINIC | Age: 74
End: 2024-05-23
Payer: MEDICARE

## 2024-06-10 ENCOUNTER — OFFICE VISIT (OUTPATIENT)
Dept: PRIMARY CARE | Facility: CLINIC | Age: 74
End: 2024-06-10
Payer: MEDICARE

## 2024-06-10 VITALS
TEMPERATURE: 98 F | OXYGEN SATURATION: 98 % | BODY MASS INDEX: 31.19 KG/M2 | HEART RATE: 75 BPM | DIASTOLIC BLOOD PRESSURE: 70 MMHG | HEIGHT: 65 IN | SYSTOLIC BLOOD PRESSURE: 140 MMHG | WEIGHT: 187.2 LBS
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
## 2024-06-10 DIAGNOSIS — I25.10 ATHEROSCLEROSIS OF CORONARY ARTERY OF NATIVE HEART WITHOUT ANGINA PECTORIS, UNSPECIFIED VESSEL OR LESION TYPE: ICD-10-CM

## 2024-06-10 DIAGNOSIS — N18.4 CKD STAGE 4 DUE TO TYPE 2 DIABETES MELLITUS (MULTI): ICD-10-CM

## 2024-06-10 DIAGNOSIS — Z12.31 ENCOUNTER FOR SCREENING MAMMOGRAM FOR BREAST CANCER: ICD-10-CM

## 2024-06-10 DIAGNOSIS — Z79.4 TYPE 2 DIABETES MELLITUS WITH STAGE 4 CHRONIC KIDNEY DISEASE, WITH LONG-TERM CURRENT USE OF INSULIN (MULTI): ICD-10-CM

## 2024-06-10 DIAGNOSIS — Z00.00 ROUTINE GENERAL MEDICAL EXAMINATION AT HEALTH CARE FACILITY: Primary | ICD-10-CM

## 2024-06-10 DIAGNOSIS — E11.22 CKD STAGE 4 DUE TO TYPE 2 DIABETES MELLITUS (MULTI): ICD-10-CM

## 2024-06-10 DIAGNOSIS — E11.22 TYPE 2 DIABETES MELLITUS WITH STAGE 4 CHRONIC KIDNEY DISEASE, WITH LONG-TERM CURRENT USE OF INSULIN (MULTI): ICD-10-CM

## 2024-06-10 DIAGNOSIS — N18.4 TYPE 2 DIABETES MELLITUS WITH STAGE 4 CHRONIC KIDNEY DISEASE, WITH LONG-TERM CURRENT USE OF INSULIN (MULTI): ICD-10-CM

## 2024-06-10 PROCEDURE — G0439 PPPS, SUBSEQ VISIT: HCPCS | Performed by: FAMILY MEDICINE

## 2024-06-10 PROCEDURE — 99214 OFFICE O/P EST MOD 30 MIN: CPT | Performed by: FAMILY MEDICINE

## 2024-06-10 PROCEDURE — 4010F ACE/ARB THERAPY RXD/TAKEN: CPT | Performed by: FAMILY MEDICINE

## 2024-06-10 PROCEDURE — 1123F ACP DISCUSS/DSCN MKR DOCD: CPT | Performed by: FAMILY MEDICINE

## 2024-06-10 PROCEDURE — 1159F MED LIST DOCD IN RCRD: CPT | Performed by: FAMILY MEDICINE

## 2024-06-10 PROCEDURE — 3078F DIAST BP <80 MM HG: CPT | Performed by: FAMILY MEDICINE

## 2024-06-10 PROCEDURE — 3062F POS MACROALBUMINURIA REV: CPT | Performed by: FAMILY MEDICINE

## 2024-06-10 PROCEDURE — 1036F TOBACCO NON-USER: CPT | Performed by: FAMILY MEDICINE

## 2024-06-10 PROCEDURE — 3077F SYST BP >= 140 MM HG: CPT | Performed by: FAMILY MEDICINE

## 2024-06-10 PROCEDURE — 1160F RVW MEDS BY RX/DR IN RCRD: CPT | Performed by: FAMILY MEDICINE

## 2024-06-10 PROCEDURE — 1158F ADVNC CARE PLAN TLK DOCD: CPT | Performed by: FAMILY MEDICINE

## 2024-06-10 PROCEDURE — 1170F FXNL STATUS ASSESSED: CPT | Performed by: FAMILY MEDICINE

## 2024-06-10 ASSESSMENT — ENCOUNTER SYMPTOMS
DYSURIA: 0
COUGH: 0
OCCASIONAL FEELINGS OF UNSTEADINESS: 0
ACTIVITY CHANGE: 0
NAUSEA: 0
ABDOMINAL PAIN: 0
FATIGUE: 0
SINUS PRESSURE: 0
APPETITE CHANGE: 0
LIGHT-HEADEDNESS: 0
DIARRHEA: 0
NERVOUS/ANXIOUS: 0
VOMITING: 0
EYE PAIN: 0
JOINT SWELLING: 0
WOUND: 0
CONSTIPATION: 0
SHORTNESS OF BREATH: 0
SLEEP DISTURBANCE: 0
ARTHRALGIAS: 0
DYSPHORIC MOOD: 0
BLOOD IN STOOL: 0
HEADACHES: 0
DIZZINESS: 0
PALPITATIONS: 0
DEPRESSION: 0

## 2024-06-10 ASSESSMENT — PATIENT HEALTH QUESTIONNAIRE - PHQ9
1. LITTLE INTEREST OR PLEASURE IN DOING THINGS: NOT AT ALL
SUM OF ALL RESPONSES TO PHQ9 QUESTIONS 1 AND 2: 0
1. LITTLE INTEREST OR PLEASURE IN DOING THINGS: NOT AT ALL
SUM OF ALL RESPONSES TO PHQ9 QUESTIONS 1 AND 2: 0
2. FEELING DOWN, DEPRESSED OR HOPELESS: NOT AT ALL
2. FEELING DOWN, DEPRESSED OR HOPELESS: NOT AT ALL

## 2024-06-10 ASSESSMENT — ACTIVITIES OF DAILY LIVING (ADL)
BATHING: INDEPENDENT
DOING_HOUSEWORK: INDEPENDENT
GROCERY_SHOPPING: INDEPENDENT
MANAGING_FINANCES: INDEPENDENT
TAKING_MEDICATION: INDEPENDENT
DRESSING: INDEPENDENT

## 2024-06-10 NOTE — PROGRESS NOTES
Subjective   Reason for Visit: Li Valdovinos is an 74 y.o. female here for a Medicare Wellness visit.     Past Medical, Surgical, and Family History reviewed and updated in chart.    Reviewed all medications by prescribing practitioner or clinical pharmacist (such as prescriptions, OTCs, herbal therapies and supplements) and documented in the medical record.    HPI  **care team   endo- Rima Morel - following DM2   nephrology- Dr. Mcbride-  for CKD4- gfr 24 2/24 Considering getting on transplant list. Trying to hold off dialysis   GI Dr. Vollweiler-follows regularly for GERD and colon polyps last colonoscopy 2021- due in 11/2024  cardio-Dr Cooper   Podiatry- for nail care  Eye- follows [facility]    Numbness and tingling in hands  R>L  Comes and goes   Is right handed   Worse in last 1 month   Worse if sleeping on that side  No weakness   Known OA in neck and told that was cause in past   No neck pain   No injury   Had bilateral CTS surgery in past  She is not interested in any steroids as this runs her sugar.  Not interested in injections  She has followed with orthopedics for this in the last few years      Thinks bug bite on right upper thigh   Just happened last night.  Wants to make sure not a tick bite as she has never seen 1 of these before.  She did not find anything attached  No itching       Patient Self Assessment of Health Status  Patient Self Assessment: Good    Nutrition and Exercise  Current Diet: Well Balanced Diet  Adequate Fluid Intake: Yes  Caffeine: Yes  Exercise Frequency: Regularly    Functional Ability/Level of Safety  Cognitive Impairment Observed: No cognitive impairment observed  Cognitive Impairment Reported: No cognitive impairment reported by patient or family    Home Safety Risk Factors: None    Patient Care Team:  Remington Rondon,  as PCP - General (Family Medicine)     Review of Systems   Constitutional:  Negative for activity change, appetite change and fatigue.   HENT:  Negative  "for congestion, postnasal drip and sinus pressure.    Eyes:  Negative for pain and visual disturbance.   Respiratory:  Negative for cough and shortness of breath.    Cardiovascular:  Negative for chest pain, palpitations and leg swelling.   Gastrointestinal:  Negative for abdominal pain, blood in stool, constipation, diarrhea, nausea and vomiting.   Endocrine: Negative for cold intolerance and heat intolerance.   Genitourinary:  Negative for dysuria and menstrual problem.   Musculoskeletal:  Negative for arthralgias and joint swelling.   Skin:  Negative for rash and wound.   Neurological:  Negative for dizziness, light-headedness and headaches.   Psychiatric/Behavioral:  Negative for dysphoric mood and sleep disturbance. The patient is not nervous/anxious.        Objective   Vitals:  /70 (BP Location: Left arm, Patient Position: Sitting, BP Cuff Size: Adult)   Pulse 75   Temp 36.7 °C (98 °F) (Temporal)   Ht 1.65 m (5' 4.96\")   Wt 84.9 kg (187 lb 3.2 oz)   SpO2 98%   BMI 31.19 kg/m²       Physical Exam  Vitals and nursing note reviewed.   Constitutional:       Appearance: Normal appearance. She is normal weight.   HENT:      Head: Normocephalic and atraumatic.      Right Ear: External ear normal.      Left Ear: External ear normal.      Mouth/Throat:      Mouth: Mucous membranes are moist.   Eyes:      Conjunctiva/sclera: Conjunctivae normal.   Cardiovascular:      Rate and Rhythm: Normal rate and regular rhythm.      Heart sounds: Normal heart sounds.   Pulmonary:      Effort: Pulmonary effort is normal.      Breath sounds: Normal breath sounds.   Musculoskeletal:         General: No swelling.      Right elbow: Normal.      Left elbow: Normal.      Right forearm: Normal.      Left forearm: Normal.      Right wrist: Normal.      Left wrist: Normal.      Right hand: Normal.      Left hand: Normal.      Cervical back: Neck supple. No rigidity. Decreased range of motion (slightly globally).   Skin:     " General: Skin is warm and dry.      Capillary Refill: Capillary refill takes less than 2 seconds.          Neurological:      General: No focal deficit present.      Mental Status: She is alert. Mental status is at baseline.   Psychiatric:         Mood and Affect: Mood normal.         Behavior: Behavior normal.         Thought Content: Thought content normal.         Judgment: Judgment normal.         Assessment/Plan   Problem List Items Addressed This Visit       CAD (coronary atherosclerotic disease)    Relevant Orders    Lipid Panel    CKD stage 4 due to type 2 diabetes mellitus (Multi)    Relevant Orders    CBC and Auto Differential    Diabetes mellitus, type 2 (Multi)     Other Visit Diagnoses       Routine general medical examination at health care facility    -  Primary    Encounter for screening mammogram for breast cancer        Relevant Orders    BI mammo bilateral screening tomosynthesis    Bug bite, initial encounter              Discussed I do not believe this is a tick bite.  Monitor for any worsening symptoms.  Keep area clean and dry  In terms of her arm numbness and tingling sounds consistent with cervical radiculopathy versus cubital tunnel.  She declines any additional workup or treatment at this point.  She will continue to monitor her symptoms  Obtain labs as above.    Overall doing well  Colonoscopy due 11/24 and she plans to schedule  Mammogram due  Continue to follow with remainder of her care team as listed above   Advance care planning and healthcare power of  discussed with patient.  Updated in chart if applicable.

## 2024-06-27 ENCOUNTER — TELEPHONE (OUTPATIENT)
Dept: ENDOCRINOLOGY | Facility: CLINIC | Age: 74
End: 2024-06-27
Payer: MEDICARE

## 2024-06-27 NOTE — TELEPHONE ENCOUNTER
Received faxed request from [facility] on 6/27 for most recent office note.    Note from April 2024 faxed to [phone] on 6/27

## 2024-08-14 ENCOUNTER — HOSPITAL ENCOUNTER (OUTPATIENT)
Dept: RADIOLOGY | Facility: CLINIC | Age: 74
Discharge: HOME | End: 2024-08-14
Payer: MEDICARE

## 2024-08-14 VITALS — HEIGHT: 65 IN | BODY MASS INDEX: 31.16 KG/M2 | WEIGHT: 187 LBS

## 2024-08-14 DIAGNOSIS — Z12.31 ENCOUNTER FOR SCREENING MAMMOGRAM FOR BREAST CANCER: ICD-10-CM

## 2024-08-14 PROCEDURE — 77063 BREAST TOMOSYNTHESIS BI: CPT | Performed by: RADIOLOGY

## 2024-08-14 PROCEDURE — 77067 SCR MAMMO BI INCL CAD: CPT

## 2024-08-14 PROCEDURE — 77067 SCR MAMMO BI INCL CAD: CPT | Performed by: RADIOLOGY

## 2024-08-19 ENCOUNTER — TELEPHONE (OUTPATIENT)
Dept: PRIMARY CARE | Facility: CLINIC | Age: 74
End: 2024-08-19
Payer: MEDICARE

## 2024-08-19 NOTE — TELEPHONE ENCOUNTER
----- Message from Remington Rondon sent at 8/19/2024  6:30 AM EDT -----  Mammogram is normal. Recheck yearly unless concern sooner.

## 2024-08-23 ENCOUNTER — TELEPHONE (OUTPATIENT)
Dept: ENDOCRINOLOGY | Facility: CLINIC | Age: 74
End: 2024-08-23
Payer: MEDICARE

## 2024-08-23 NOTE — TELEPHONE ENCOUNTER
Last office visit:  4/16/24  Next office visit:  10/22/24    Received a CMN from \[facility]\"" on 8/23  For CGM    Form completed by me and signed by Rima Morel  Faxed/emailed to:  [phone] on 8/23    Form on file in email

## 2024-10-18 ENCOUNTER — TELEPHONE (OUTPATIENT)
Dept: ENDOCRINOLOGY | Facility: CLINIC | Age: 74
End: 2024-10-18
Payer: MEDICARE

## 2024-10-18 NOTE — TELEPHONE ENCOUNTER
Last office visit:  04/16/24  Next office visit:  10/22/24    Received faxed request from [facility] on 10/18/24 for most recent office note.    Holding till upcoming appt to send compliant notes

## 2024-10-22 ENCOUNTER — APPOINTMENT (OUTPATIENT)
Dept: ENDOCRINOLOGY | Facility: CLINIC | Age: 74
End: 2024-10-22
Payer: MEDICARE

## 2024-10-22 VITALS
DIASTOLIC BLOOD PRESSURE: 80 MMHG | HEIGHT: 65 IN | HEART RATE: 80 BPM | BODY MASS INDEX: 30.87 KG/M2 | WEIGHT: 185.3 LBS | SYSTOLIC BLOOD PRESSURE: 160 MMHG

## 2024-10-22 DIAGNOSIS — N18.4 TYPE 2 DIABETES MELLITUS WITH STAGE 4 CHRONIC KIDNEY DISEASE, WITH LONG-TERM CURRENT USE OF INSULIN (MULTI): Primary | ICD-10-CM

## 2024-10-22 DIAGNOSIS — Z79.4 TYPE 2 DIABETES MELLITUS WITH STAGE 4 CHRONIC KIDNEY DISEASE, WITH LONG-TERM CURRENT USE OF INSULIN (MULTI): Primary | ICD-10-CM

## 2024-10-22 DIAGNOSIS — R93.1 AGATSTON CORONARY ARTERY CALCIUM SCORE BETWEEN 200 AND 399: ICD-10-CM

## 2024-10-22 DIAGNOSIS — E11.65 TYPE 2 DIABETES MELLITUS WITH HYPERGLYCEMIA, UNSPECIFIED WHETHER LONG TERM INSULIN USE (MULTI): ICD-10-CM

## 2024-10-22 DIAGNOSIS — E11.22 CKD STAGE 4 DUE TO TYPE 2 DIABETES MELLITUS (MULTI): ICD-10-CM

## 2024-10-22 DIAGNOSIS — E11.22 TYPE 2 DIABETES MELLITUS WITH STAGE 4 CHRONIC KIDNEY DISEASE, WITH LONG-TERM CURRENT USE OF INSULIN (MULTI): Primary | ICD-10-CM

## 2024-10-22 DIAGNOSIS — E78.2 MIXED HYPERLIPIDEMIA: ICD-10-CM

## 2024-10-22 DIAGNOSIS — N18.4 CKD STAGE 4 DUE TO TYPE 2 DIABETES MELLITUS (MULTI): ICD-10-CM

## 2024-10-22 DIAGNOSIS — I10 PRIMARY HYPERTENSION: ICD-10-CM

## 2024-10-22 LAB — POC HEMOGLOBIN A1C: 6.6 % (ref 4.2–6.5)

## 2024-10-22 PROCEDURE — 3077F SYST BP >= 140 MM HG: CPT | Performed by: NURSE PRACTITIONER

## 2024-10-22 PROCEDURE — 3008F BODY MASS INDEX DOCD: CPT | Performed by: NURSE PRACTITIONER

## 2024-10-22 PROCEDURE — 95251 CONT GLUC MNTR ANALYSIS I&R: CPT | Performed by: NURSE PRACTITIONER

## 2024-10-22 PROCEDURE — 1123F ACP DISCUSS/DSCN MKR DOCD: CPT | Performed by: NURSE PRACTITIONER

## 2024-10-22 PROCEDURE — 3079F DIAST BP 80-89 MM HG: CPT | Performed by: NURSE PRACTITIONER

## 2024-10-22 PROCEDURE — 99215 OFFICE O/P EST HI 40 MIN: CPT | Performed by: NURSE PRACTITIONER

## 2024-10-22 PROCEDURE — 1159F MED LIST DOCD IN RCRD: CPT | Performed by: NURSE PRACTITIONER

## 2024-10-22 PROCEDURE — 83036 HEMOGLOBIN GLYCOSYLATED A1C: CPT | Performed by: NURSE PRACTITIONER

## 2024-10-22 PROCEDURE — 3062F POS MACROALBUMINURIA REV: CPT | Performed by: NURSE PRACTITIONER

## 2024-10-22 PROCEDURE — 4010F ACE/ARB THERAPY RXD/TAKEN: CPT | Performed by: NURSE PRACTITIONER

## 2024-10-22 PROCEDURE — 1036F TOBACCO NON-USER: CPT | Performed by: NURSE PRACTITIONER

## 2024-10-22 PROCEDURE — G2211 COMPLEX E/M VISIT ADD ON: HCPCS | Performed by: NURSE PRACTITIONER

## 2024-10-22 RX ORDER — SYRINGE AND NEEDLE,INSULIN,1ML 31GX15/64"
90 SYRINGE, EMPTY DISPOSABLE MISCELLANEOUS DAILY
Qty: 100 EACH | Refills: 3 | Status: SHIPPED | OUTPATIENT
Start: 2024-10-22

## 2024-10-22 ASSESSMENT — ENCOUNTER SYMPTOMS
NUMBNESS: 0
FREQUENCY: 0
APPETITE CHANGE: 0
WEAKNESS: 0
ACTIVITY CHANGE: 0
DIZZINESS: 0
SLEEP DISTURBANCE: 0
SEIZURES: 0
SHORTNESS OF BREATH: 0
NAUSEA: 0
POLYDIPSIA: 0
CONSTIPATION: 0
NERVOUS/ANXIOUS: 0
PALPITATIONS: 0
POLYPHAGIA: 0
DIARRHEA: 0
FATIGUE: 0

## 2024-10-22 NOTE — PATIENT INSTRUCTIONS
Type 2 diabetes mellitus, is at goal. A1c 6.6% at today's appointment  RX changes:   REDUCE Lantus 66 units  Humalog up to 150 units a day  Breakfast 15-20 Units  Lunch 35-40 Units  Dinner 50 Units INCREASE by 5 units, especially at large meals.   JARDIANCE 10 mg tablet daily  Hypertension: At goal. No changes.  Hyperlipidemia: At goal. No changes: Updated labs ordered at this visit.   Stage 4 CKD GFR 24. Refuses Jardiance. Please schedule appointment ASAP.   Education:  interpretation of lab results, CGM download.   Follow up: I recommend diabetes care be 6 months.

## 2024-10-22 NOTE — PROGRESS NOTES
Subjective   Li Valdovinos is a 74 y.o. female who presents for follow-up of Type 2 diabetes mellitus. The initial diagnosis of diabetes was made  Age 45 . The patient does have a known family history of diabetes father and paternal uncle, maternal cousins.     passed away earlier this month from complications of his fistula failing.     Known complications due to diabetes included obesity and CKD stage 4 with GFR 24 Feb 2024. She was prescribed Jardiance at last visitm, but does not want to take.    Cardiovascular risk factors include advanced age (older than 55 for men, 65 for women), diabetes mellitus, dyslipidemia, and hypertension. The patient is on an ACE inhibitor or angiotensin II receptor blocker.      Current diabetes regimen is as follows:   Continue Lantus 72 units  Humalog up to 150 units a day  Breakfast 15-20 Units  Lunch 35-40 Units  Dinner: Not taking often, but when she is 50 Units INCREASE by 5 units, especially at large meals.     The patient is currently checking the blood glucose 8 times per day.  Patient is using: continuous glucose monitor G6    Hypoglycemia frequency: <1%  Hypoglycemia awareness: Yes     Exercise: daily   Meal panning: She is using avoidance of concentrated sweets.    Review of Systems   Constitutional:  Negative for activity change, appetite change and fatigue.   Respiratory:  Negative for shortness of breath.    Cardiovascular:  Negative for chest pain, palpitations and leg swelling.   Gastrointestinal:  Negative for constipation, diarrhea and nausea.   Endocrine: Negative for cold intolerance, heat intolerance, polydipsia, polyphagia and polyuria.   Genitourinary:  Negative for frequency.   Musculoskeletal:  Negative for gait problem.   Skin:  Negative for rash.   Neurological:  Negative for dizziness, seizures, weakness and numbness.   Psychiatric/Behavioral:  Negative for sleep disturbance and suicidal ideas. The patient is not nervous/anxious.      Objective  "  /80 (BP Location: Left arm, Patient Position: Sitting)   Pulse 80   Ht 1.648 m (5' 4.9\")   Wt 84.1 kg (185 lb 4.8 oz)   BMI 30.93 kg/m²   Physical Exam  Constitutional:       Appearance: Normal appearance.   Pulmonary:      Effort: Pulmonary effort is normal.   Skin:     General: Skin is warm and dry.   Neurological:      Mental Status: She is alert and oriented to person, place, and time.   Psychiatric:         Mood and Affect: Mood normal.         Behavior: Behavior normal.         Thought Content: Thought content normal.         Judgment: Judgment normal.       Lab Review  Glucose (mg/dL)   Date Value   02/02/2024 107 (H)   10/16/2023 132 (H)   06/20/2023 105 (H)   04/21/2022 98   11/24/2020 116 (H)     POC HEMOGLOBIN A1c (%)   Date Value   10/22/2024 6.6 (A)   04/16/2024 7.1 (A)   10/20/2023 6.6 (A)     Bicarbonate (mmol/L)   Date Value   02/02/2024 25   10/16/2023 24   06/20/2023 25   04/21/2022 28   11/24/2020 22     Urea Nitrogen (mg/dL)   Date Value   02/02/2024 24 (H)   10/16/2023 34 (H)   06/20/2023 53 (H)   04/21/2022 30 (H)   11/24/2020 34 (H)     Creatinine (mg/dL)   Date Value   02/02/2024 2.17 (H)   10/16/2023 2.32 (H)   06/20/2023 2.55 (H)   04/21/2022 2.08 (H)   11/24/2020 2.09 (H)       Downloaded and interrogated Dexcom CGM; Reviewed last 2 weeks of data to make treatment decisions.   Average Glucose 159 mg/dL  GMI 7.1%  Coefficient of Variation  29.4%  Time CGM Active 98.6%    Health Maintenance:   Foot Exam: July 2023, denies issues  Eye Exam: Jan 2024. Denies retinopathy.   Lipid Panel: Total 122, HDL 64, LDL 46 June 2023: ordered by primary, labs pending.   GFR 22: She saw nephrologist in Feb 2024 with GFR 22. Was encouraged to take SGT2i.     Assessment/Plan   Diagnoses and all orders for this visit:  Type 2 diabetes mellitus with stage 4 chronic kidney disease, with long-term current use of insulin (Multi)  -     Lipid panel; Future  -     Renal Function Panel; Future  -     " "Basic metabolic panel; Future  -     PTH, intact; Future  Type 2 diabetes mellitus with hyperglycemia, unspecified whether long term insulin use (Multi)  -     POCT glycosylated hemoglobin (Hb A1C) manually resulted  -     insulin syringe-needle U-100 1 mL 31 gauge x 15/64\" syringe; 90 each once daily. Use and discard 1 syringe daily  CKD stage 4 due to type 2 diabetes mellitus (Multi)  Primary hypertension  Mixed hyperlipidemia  Agatston coronary artery calcium score between 200 and 399  Type 2 diabetes mellitus, is at goal. A1c 6.6% at today's appointment  RX changes:   REDUCE Lantus 66 units  Humalog up to 150 units a day  Breakfast 15-20 Units  Lunch 35-40 Units  Dinner 50 Units INCREASE by 5 units, especially at large meals.   JARDIANCE 10 mg tablet daily  Hypertension: BP is elevated at today's visit. She is visibly upset due to 's death. She has a BP cuff at home and agrees to check home BP and report in 2 weeks.   Hyperlipidemia: At goal. No changes: Updated labs ordered at this visit.   Stage 4 CKD GFR 24. Refuses Jardiance. Please schedule appointment ASAP.   Education:  interpretation of lab results, CGM download.   Follow up: I recommend diabetes care be 6 months.  "

## 2024-10-24 ENCOUNTER — APPOINTMENT (OUTPATIENT)
Dept: ENDOCRINOLOGY | Facility: CLINIC | Age: 74
End: 2024-10-24
Payer: MEDICARE

## 2024-12-20 DIAGNOSIS — I10 PRIMARY HYPERTENSION: ICD-10-CM

## 2024-12-20 DIAGNOSIS — E11.65 TYPE 2 DIABETES MELLITUS WITH HYPERGLYCEMIA, UNSPECIFIED WHETHER LONG TERM INSULIN USE (MULTI): ICD-10-CM

## 2024-12-20 DIAGNOSIS — N18.4 CKD (CHRONIC KIDNEY DISEASE) STAGE 4, GFR 15-29 ML/MIN (MULTI): ICD-10-CM

## 2024-12-20 DIAGNOSIS — E79.0 ELEVATED URIC ACID IN BLOOD: Primary | ICD-10-CM

## 2024-12-20 RX ORDER — PEN NEEDLE, DIABETIC 29 G X1/2"
NEEDLE, DISPOSABLE MISCELLANEOUS
COMMUNITY
Start: 2024-04-16

## 2024-12-20 RX ORDER — LISINOPRIL 20 MG/1
20 TABLET ORAL DAILY
Qty: 90 TABLET | Refills: 1 | Status: SHIPPED | OUTPATIENT
Start: 2024-12-20 | End: 2025-06-18

## 2024-12-20 RX ORDER — METOPROLOL TARTRATE 25 MG/1
12.5 TABLET, FILM COATED ORAL DAILY
Qty: 45 TABLET | Refills: 1 | Status: SHIPPED | OUTPATIENT
Start: 2024-12-20 | End: 2025-06-18

## 2024-12-20 RX ORDER — ALLOPURINOL 100 MG/1
100 TABLET ORAL
Qty: 45 TABLET | Refills: 1 | Status: SHIPPED | OUTPATIENT
Start: 2024-12-20

## 2024-12-20 RX ORDER — ALLOPURINOL 100 MG/1
1 TABLET ORAL
COMMUNITY
Start: 2024-07-11 | End: 2024-12-20 | Stop reason: SDUPTHER

## 2024-12-20 NOTE — TELEPHONE ENCOUNTER
Pt calling for a refill on her Allopurinol 100 mg, pt states she takes half a tablet every other day, Metoprolol 25 mg half a tablet daily and Lisinopril 20 mg one daily [facility]. LOV 6/10/2024.

## 2025-01-20 ENCOUNTER — TELEPHONE (OUTPATIENT)
Dept: ENDOCRINOLOGY | Facility: CLINIC | Age: 75
End: 2025-01-20
Payer: MEDICARE

## 2025-01-20 DIAGNOSIS — Z79.4 TYPE 2 DIABETES MELLITUS WITH STAGE 4 CHRONIC KIDNEY DISEASE, WITH LONG-TERM CURRENT USE OF INSULIN (MULTI): Primary | ICD-10-CM

## 2025-01-20 DIAGNOSIS — E11.22 TYPE 2 DIABETES MELLITUS WITH STAGE 4 CHRONIC KIDNEY DISEASE, WITH LONG-TERM CURRENT USE OF INSULIN (MULTI): Primary | ICD-10-CM

## 2025-01-20 DIAGNOSIS — N18.4 TYPE 2 DIABETES MELLITUS WITH STAGE 4 CHRONIC KIDNEY DISEASE, WITH LONG-TERM CURRENT USE OF INSULIN (MULTI): Primary | ICD-10-CM

## 2025-01-20 RX ORDER — PEN NEEDLE, DIABETIC 29 G X1/2"
NEEDLE, DISPOSABLE MISCELLANEOUS
Qty: 200 EACH | Refills: 9 | Status: SHIPPED | OUTPATIENT
Start: 2025-01-20

## 2025-01-20 NOTE — TELEPHONE ENCOUNTER
"This nurse spoke with the patient regarding an call the patient made to another office trying to reach the provider. The patient states “I have questions regarding my upcoming procedure on Friday and how to administer my insulin during the Colonoscopy; I need my Syringe prescription from [facility] filled.” Patient states “there is an issue at [facility] with my prescriptions and they will not let me fill my prescriptions saying I do not have insurance.” This nurse will call [facility]. The patient states “I do not use my chart, I am not sure how to use it.” The patient is not sure what time her appointment is on Friday but knows she is NPO on Thursday; patient states \"they will call me Thursday with the time of my procedure for Friday. I use 50 units a day of insulin.\"   "

## 2025-01-20 NOTE — TELEPHONE ENCOUNTER
Spoke with patient. She is still dropping overnight and is currently taking lantus 50 units daily.     Instructed to reduce to 45 units before bed.    Discussed that the night before prep starts and the night before procedure REDUCE LANTUS to 35. Instructed to hold all Humalog    Instructed to use sugar free Gatorade or Powerade zero for prep.     Rx sent for 0.5 ml syringes.

## 2025-02-19 ENCOUNTER — OFFICE VISIT (OUTPATIENT)
Dept: URGENT CARE | Age: 75
End: 2025-02-19
Payer: MEDICARE

## 2025-02-19 ENCOUNTER — ANCILLARY PROCEDURE (OUTPATIENT)
Dept: URGENT CARE | Age: 75
End: 2025-02-19
Payer: MEDICARE

## 2025-02-19 VITALS
HEART RATE: 71 BPM | RESPIRATION RATE: 16 BRPM | OXYGEN SATURATION: 99 % | BODY MASS INDEX: 30.05 KG/M2 | SYSTOLIC BLOOD PRESSURE: 142 MMHG | TEMPERATURE: 98.1 F | DIASTOLIC BLOOD PRESSURE: 79 MMHG | WEIGHT: 180 LBS

## 2025-02-19 DIAGNOSIS — M19.012 ARTHRITIS OF LEFT SHOULDER REGION: ICD-10-CM

## 2025-02-19 DIAGNOSIS — M25.512 ACUTE PAIN OF LEFT SHOULDER: ICD-10-CM

## 2025-02-19 DIAGNOSIS — M25.512 ACUTE PAIN OF LEFT SHOULDER: Primary | ICD-10-CM

## 2025-02-19 PROCEDURE — 3077F SYST BP >= 140 MM HG: CPT | Performed by: NURSE PRACTITIONER

## 2025-02-19 PROCEDURE — 1125F AMNT PAIN NOTED PAIN PRSNT: CPT | Performed by: NURSE PRACTITIONER

## 2025-02-19 PROCEDURE — 3078F DIAST BP <80 MM HG: CPT | Performed by: NURSE PRACTITIONER

## 2025-02-19 PROCEDURE — 4010F ACE/ARB THERAPY RXD/TAKEN: CPT | Performed by: NURSE PRACTITIONER

## 2025-02-19 PROCEDURE — 73030 X-RAY EXAM OF SHOULDER: CPT | Mod: LEFT SIDE | Performed by: NURSE PRACTITIONER

## 2025-02-19 PROCEDURE — 99213 OFFICE O/P EST LOW 20 MIN: CPT | Performed by: NURSE PRACTITIONER

## 2025-02-19 PROCEDURE — 1123F ACP DISCUSS/DSCN MKR DOCD: CPT | Performed by: NURSE PRACTITIONER

## 2025-02-19 PROCEDURE — 1159F MED LIST DOCD IN RCRD: CPT | Performed by: NURSE PRACTITIONER

## 2025-02-19 PROCEDURE — 1036F TOBACCO NON-USER: CPT | Performed by: NURSE PRACTITIONER

## 2025-02-19 ASSESSMENT — ENCOUNTER SYMPTOMS
LOSS OF SENSATION IN FEET: 0
OCCASIONAL FEELINGS OF UNSTEADINESS: 0
DEPRESSION: 0

## 2025-02-19 ASSESSMENT — PAIN SCALES - GENERAL: PAINLEVEL_OUTOF10: 5

## 2025-02-20 NOTE — PROGRESS NOTES
Subjective   Patient ID: Li Valdovinos is a 74 y.o. female. They present today with a chief complaint of Shoulder Pain (C/o left shoulder since ).    History of Present Illness  75 yo female coming in for left shoulder pain since . She states on  both shoulders were hurting but now just the left shoulder is hurting. She denies any injury to the shoulder. She states she has arthritis and is not sure if this is a flare of the arthritis. She denies any chest pain or shortness of breath. She denies any other complaints.     Past Medical History  Allergies as of 2025 - Reviewed 2025   Allergen Reaction Noted    Levaquin [levofloxacin] Palpitations and Other 2013    Atorvastatin Other 2023    Codeine Other 2023    Meperidine Other 2023    Pollen extracts Other 2023       (Not in a hospital admission)       Past Medical History:   Diagnosis Date    Personal history of colonic polyps     History of colonic polyps    Personal history of other diseases of the digestive system     History of gastroesophageal reflux (GERD)    Personal history of other diseases of the female genital tract     Personal history of endometriosis    Personal history of other specified conditions     History of breast lump       Past Surgical History:   Procedure Laterality Date    ANKLE SURGERY  10/23/2014    Ankle Surgery    APPENDECTOMY  10/23/2014    Appendectomy     SECTION, CLASSIC  10/23/2014     Section    CHOLECYSTECTOMY  10/23/2014    Cholecystectomy    COLONOSCOPY  2016    Complete Colonoscopy    DILATION AND CURETTAGE OF UTERUS  10/23/2014    Dilation And Curettage    OTHER SURGICAL HISTORY  10/23/2014    Surgical Lysis Of Intestinal Adhesions    TONSILLECTOMY  10/23/2014    Tonsillectomy        reports that she has never smoked. She has never used smokeless tobacco. She reports that she does not drink alcohol and does not use drugs.    Review of  Systems  Review of Systems:  General: No weight loss, fatigue, anorexia, insomnia, fever, chills.  Cardiac: No chest pain, palpitations, syncope, near syncope.  Pulmonary:  No shortness of breath, cough, hemoptysis  Heme/lymph: No swollen glands, fever, bleeding  Musculoskeletal: No limb pain, Positive left shoulder pain  Skin: No rashes  Neuro: No numbness, tingling, headaches                                 Objective    Vitals:    02/19/25 1914   BP: 142/79   Pulse: 71   Resp: 16   Temp: 36.7 °C (98.1 °F)   SpO2: 99%   Weight: 81.6 kg (180 lb)     No LMP recorded. Patient is postmenopausal.    Physical Exam  Shoulder Injury:  Appearance: Alert, oriented, cooperative, in no acute distress. Well-nourished and well hydrated.   Skin: Intact, dry skin, no lesions, rash, petechiae or purpura  Pulmonary: Clear bilaterally with good chest wall excursion. No rales, rhonchi or wheezing. No accessory muscle use or stridor.   Cardiac: Normal S1, S2 without murmur, rub, gallop, or extrasystole. No JVD.  Musculoskeletal: No peripheral edema. Exam of the left shoulder shows no deformity, edema, or erythema. There is pain on palpation to the glenohumeral joint. There is tenderness on palpation to the posterior portion of the left shoulder as well. There is decreased active range of motion. Passive range of motion is slightly improved over active range of motion. Patient has strong equal hand grasps bilaterally, is neurovascularly intact. Skin is intact.      Procedures    Point of Care Test & Imaging Results from this visit  No results found for this visit on 02/19/25.   No results found.    Diagnostic study results (if any) were reviewed by BENNY Valle.    Assessment/Plan   Allergies, medications, history, and pertinent labs/EKGs/Imaging reviewed by BENNY Valle.     Medical Decision Making  Testing: Left shoulder XR  Treatment: Advised use of heat, ice, and OTC pain medications  Differential: 1)  shoulder strain , 2) shoulder arthritis, 3) bursitis  Plan: Patient will follow up with the PCP in the next 2-3 days. Return for any worsening symptoms or go to the ER for further evaluation. Patient understands return precautions and discharge insturctions.  Impression:   1) Shoulder arthritis      Orders and Diagnoses  Diagnoses and all orders for this visit:  Acute pain of left shoulder  -     XR shoulder left 2+ views; Future  Arthritis of left shoulder region      Medical Admin Record      Patient disposition: Home    Electronically signed by BENNY Valle  8:00 PM

## 2025-03-29 LAB
ALBUMIN SERPL-MCNC: 4.1 G/DL (ref 3.6–5.1)
ANION GAP SERPL CALCULATED.4IONS-SCNC: 10 MMOL/L (CALC) (ref 7–17)
BASOPHILS # BLD AUTO: 48 CELLS/UL (ref 0–200)
BASOPHILS NFR BLD AUTO: 0.7 %
BUN SERPL-MCNC: 33 MG/DL (ref 7–25)
BUN SERPL-MCNC: 33 MG/DL (ref 7–25)
BUN/CREAT SERPL: 15 (CALC) (ref 6–22)
BUN/CREAT SERPL: 15 (CALC) (ref 6–22)
CALCIUM SERPL-MCNC: 10.3 MG/DL (ref 8.6–10.4)
CALCIUM SERPL-MCNC: 10.3 MG/DL (ref 8.6–10.4)
CHLORIDE SERPL-SCNC: 106 MMOL/L (ref 98–110)
CHLORIDE SERPL-SCNC: 106 MMOL/L (ref 98–110)
CHOLEST SERPL-MCNC: 150 MG/DL
CHOLEST SERPL-MCNC: 154 MG/DL
CHOLEST/HDLC SERPL: 1.8 (CALC)
CHOLEST/HDLC SERPL: 1.8 (CALC)
CO2 SERPL-SCNC: 24 MMOL/L (ref 20–32)
CO2 SERPL-SCNC: 24 MMOL/L (ref 20–32)
CREAT SERPL-MCNC: 2.21 MG/DL (ref 0.6–1)
CREAT SERPL-MCNC: 2.21 MG/DL (ref 0.6–1)
EGFRCR SERPLBLD CKD-EPI 2021: 23 ML/MIN/1.73M2
EGFRCR SERPLBLD CKD-EPI 2021: 23 ML/MIN/1.73M2
EOSINOPHIL # BLD AUTO: 197 CELLS/UL (ref 15–500)
EOSINOPHIL NFR BLD AUTO: 2.9 %
ERYTHROCYTE [DISTWIDTH] IN BLOOD BY AUTOMATED COUNT: 12.4 % (ref 11–15)
GLUCOSE SERPL-MCNC: 129 MG/DL (ref 65–99)
GLUCOSE SERPL-MCNC: 129 MG/DL (ref 65–99)
HCT VFR BLD AUTO: 36.3 % (ref 35–45)
HDLC SERPL-MCNC: 83 MG/DL
HDLC SERPL-MCNC: 84 MG/DL
HGB BLD-MCNC: 11.5 G/DL (ref 11.7–15.5)
LDLC SERPL CALC-MCNC: 51 MG/DL (CALC)
LDLC SERPL CALC-MCNC: 54 MG/DL (CALC)
LYMPHOCYTES # BLD AUTO: 1720 CELLS/UL (ref 850–3900)
LYMPHOCYTES NFR BLD AUTO: 25.3 %
MCH RBC QN AUTO: 31.8 PG (ref 27–33)
MCHC RBC AUTO-ENTMCNC: 31.7 G/DL (ref 32–36)
MCV RBC AUTO: 100.3 FL (ref 80–100)
MONOCYTES # BLD AUTO: 510 CELLS/UL (ref 200–950)
MONOCYTES NFR BLD AUTO: 7.5 %
NEUTROPHILS # BLD AUTO: 4325 CELLS/UL (ref 1500–7800)
NEUTROPHILS NFR BLD AUTO: 63.6 %
NONHDLC SERPL-MCNC: 67 MG/DL (CALC)
NONHDLC SERPL-MCNC: 70 MG/DL (CALC)
PHOSPHATE SERPL-MCNC: 4.1 MG/DL (ref 2.1–4.3)
PLATELET # BLD AUTO: 235 THOUSAND/UL (ref 140–400)
PMV BLD REES-ECKER: 11.3 FL (ref 7.5–12.5)
POTASSIUM SERPL-SCNC: 5.1 MMOL/L (ref 3.5–5.3)
POTASSIUM SERPL-SCNC: 5.1 MMOL/L (ref 3.5–5.3)
PTH-INTACT SERPL-MCNC: 34 PG/ML (ref 16–77)
RBC # BLD AUTO: 3.62 MILLION/UL (ref 3.8–5.1)
SODIUM SERPL-SCNC: 140 MMOL/L (ref 135–146)
SODIUM SERPL-SCNC: 140 MMOL/L (ref 135–146)
TRIGL SERPL-MCNC: 77 MG/DL
TRIGL SERPL-MCNC: 80 MG/DL
WBC # BLD AUTO: 6.8 THOUSAND/UL (ref 3.8–10.8)

## 2025-03-31 ENCOUNTER — TELEPHONE (OUTPATIENT)
Dept: PRIMARY CARE | Facility: CLINIC | Age: 75
End: 2025-03-31
Payer: MEDICARE

## 2025-03-31 NOTE — TELEPHONE ENCOUNTER
----- Message from Remington Rondon sent at 3/31/2025  6:24 AM EDT -----  Let patient know overall labs drawn from me look good.  Cholesterol is good.  Anemia is slightly better than it has been in the last few checks  Keep scheduled appointment

## 2025-04-24 ENCOUNTER — TELEPHONE (OUTPATIENT)
Dept: ENDOCRINOLOGY | Facility: CLINIC | Age: 75
End: 2025-04-24

## 2025-04-24 ENCOUNTER — APPOINTMENT (OUTPATIENT)
Dept: ENDOCRINOLOGY | Facility: CLINIC | Age: 75
End: 2025-04-24
Payer: MEDICARE

## 2025-04-24 VITALS
BODY MASS INDEX: 30.68 KG/M2 | SYSTOLIC BLOOD PRESSURE: 139 MMHG | HEART RATE: 66 BPM | DIASTOLIC BLOOD PRESSURE: 84 MMHG | TEMPERATURE: 96.2 F | WEIGHT: 183.8 LBS

## 2025-04-24 DIAGNOSIS — E11.65 TYPE 2 DIABETES MELLITUS WITH HYPERGLYCEMIA, WITH LONG-TERM CURRENT USE OF INSULIN: Chronic | ICD-10-CM

## 2025-04-24 DIAGNOSIS — N18.4 TYPE 2 DIABETES MELLITUS WITH STAGE 4 CHRONIC KIDNEY DISEASE, WITH LONG-TERM CURRENT USE OF INSULIN (MULTI): Primary | ICD-10-CM

## 2025-04-24 DIAGNOSIS — Z79.4 TYPE 2 DIABETES MELLITUS WITH HYPERGLYCEMIA, WITH LONG-TERM CURRENT USE OF INSULIN: Chronic | ICD-10-CM

## 2025-04-24 DIAGNOSIS — Z79.4 TYPE 2 DIABETES MELLITUS WITH STAGE 4 CHRONIC KIDNEY DISEASE, WITH LONG-TERM CURRENT USE OF INSULIN (MULTI): Primary | ICD-10-CM

## 2025-04-24 DIAGNOSIS — N18.4 CKD STAGE 4 DUE TO TYPE 2 DIABETES MELLITUS (MULTI): ICD-10-CM

## 2025-04-24 DIAGNOSIS — E11.22 CKD STAGE 4 DUE TO TYPE 2 DIABETES MELLITUS (MULTI): ICD-10-CM

## 2025-04-24 DIAGNOSIS — E11.65 TYPE 2 DIABETES MELLITUS WITH HYPERGLYCEMIA, UNSPECIFIED WHETHER LONG TERM INSULIN USE (MULTI): ICD-10-CM

## 2025-04-24 DIAGNOSIS — E78.2 MIXED HYPERLIPIDEMIA: ICD-10-CM

## 2025-04-24 DIAGNOSIS — E11.22 TYPE 2 DIABETES MELLITUS WITH STAGE 4 CHRONIC KIDNEY DISEASE, WITH LONG-TERM CURRENT USE OF INSULIN (MULTI): Primary | ICD-10-CM

## 2025-04-24 DIAGNOSIS — I10 PRIMARY HYPERTENSION: ICD-10-CM

## 2025-04-24 LAB — POC HEMOGLOBIN A1C: 6.8 % (ref 4.2–6.5)

## 2025-04-24 PROCEDURE — G2211 COMPLEX E/M VISIT ADD ON: HCPCS | Performed by: NURSE PRACTITIONER

## 2025-04-24 PROCEDURE — 1036F TOBACCO NON-USER: CPT | Performed by: NURSE PRACTITIONER

## 2025-04-24 PROCEDURE — 3044F HG A1C LEVEL LT 7.0%: CPT | Performed by: NURSE PRACTITIONER

## 2025-04-24 PROCEDURE — 83036 HEMOGLOBIN GLYCOSYLATED A1C: CPT | Performed by: NURSE PRACTITIONER

## 2025-04-24 PROCEDURE — 3079F DIAST BP 80-89 MM HG: CPT | Performed by: NURSE PRACTITIONER

## 2025-04-24 PROCEDURE — 1159F MED LIST DOCD IN RCRD: CPT | Performed by: NURSE PRACTITIONER

## 2025-04-24 PROCEDURE — 4010F ACE/ARB THERAPY RXD/TAKEN: CPT | Performed by: NURSE PRACTITIONER

## 2025-04-24 PROCEDURE — 1126F AMNT PAIN NOTED NONE PRSNT: CPT | Performed by: NURSE PRACTITIONER

## 2025-04-24 PROCEDURE — 3075F SYST BP GE 130 - 139MM HG: CPT | Performed by: NURSE PRACTITIONER

## 2025-04-24 PROCEDURE — 99215 OFFICE O/P EST HI 40 MIN: CPT | Performed by: NURSE PRACTITIONER

## 2025-04-24 PROCEDURE — 1123F ACP DISCUSS/DSCN MKR DOCD: CPT | Performed by: NURSE PRACTITIONER

## 2025-04-24 RX ORDER — INSULIN LISPRO 200 [IU]/ML
INJECTION, SOLUTION SUBCUTANEOUS
Qty: 100 ML | Refills: 11 | Status: SHIPPED | OUTPATIENT
Start: 2025-04-24

## 2025-04-24 RX ORDER — INSULIN GLARGINE 100 [IU]/ML
INJECTION, SOLUTION SUBCUTANEOUS
Qty: 45 ML | Refills: 3 | Status: SHIPPED | OUTPATIENT
Start: 2025-04-24

## 2025-04-24 RX ORDER — PEN NEEDLE, DIABETIC 32GX 5/32"
NEEDLE, DISPOSABLE MISCELLANEOUS
COMMUNITY
Start: 2024-12-20 | End: 2025-04-24 | Stop reason: SDUPTHER

## 2025-04-24 RX ORDER — PEN NEEDLE, DIABETIC 32GX 5/32"
NEEDLE, DISPOSABLE MISCELLANEOUS
Qty: 500 EACH | Refills: 3 | Status: SHIPPED | OUTPATIENT
Start: 2025-04-24

## 2025-04-24 ASSESSMENT — ENCOUNTER SYMPTOMS
CONSTIPATION: 0
DEPRESSION: 0
POLYDIPSIA: 0
OCCASIONAL FEELINGS OF UNSTEADINESS: 0
SHORTNESS OF BREATH: 0
FREQUENCY: 0
SLEEP DISTURBANCE: 0
APPETITE CHANGE: 0
DIZZINESS: 0
DIARRHEA: 0
NAUSEA: 0
SEIZURES: 0
NUMBNESS: 0
FATIGUE: 0
LOSS OF SENSATION IN FEET: 0
ACTIVITY CHANGE: 0
WEAKNESS: 0
PALPITATIONS: 0
POLYPHAGIA: 0
NERVOUS/ANXIOUS: 0

## 2025-04-24 ASSESSMENT — PAIN SCALES - GENERAL: PAINLEVEL_OUTOF10: 0-NO PAIN

## 2025-04-24 ASSESSMENT — PATIENT HEALTH QUESTIONNAIRE - PHQ9
2. FEELING DOWN, DEPRESSED OR HOPELESS: NOT AT ALL
SUM OF ALL RESPONSES TO PHQ9 QUESTIONS 1 AND 2: 0
1. LITTLE INTEREST OR PLEASURE IN DOING THINGS: NOT AT ALL

## 2025-04-24 NOTE — TELEPHONE ENCOUNTER
Initiated order with [employer] for upgrade to G7 via [employer] on 4/24  Notes uploaded. Pending response from distributor.

## 2025-04-24 NOTE — PATIENT INSTRUCTIONS
Type 2 diabetes mellitus, is at goal. A1c 6.8% at today's appointment. You glucose is higher in the last 2 weeks due to the Easter Holiday.    RX changes:   REDUCE Lantus 46 units as you are dropping overnight.   Humalog up to 150 units a day  Breakfast 15-20 Units  Lunch 35-40 Units  Dinner 60 Units INCREASE by 10 units, especially at large meals.   We have ordered G7 for use and scheduled with Mary Kay GALICIA, for training.   Hypertension: BP is elevated at today's visit. She is visibly upset due to 's death. She has a BP cuff at home and agrees to check home BP and report in 2 weeks.   Hyperlipidemia: At goal. No changes: Updated labs ordered at this visit.   Stage 4 CKD GFR 22. Refuses Jardiance. Please schedule appointment ASAP.   Education:  interpretation of lab results, CGM download.   [facility] T[facility] will have a conference in August (16?) in [place] for your granddaughter.   Follow up: I recommend diabetes care be 4-5 months.

## 2025-04-24 NOTE — PROGRESS NOTES
Subjective   Li Valdovinos is a 74 y.o. female who presents for follow-up of Type 2 diabetes mellitus. The initial diagnosis of diabetes was made  Age 45 . The patient does have a known family history of diabetes father and paternal uncle, maternal cousins.    Known complications due to diabetes included obesity and CKD stage 4 with GFR 23 March 2025. She was prescribed Jardiance, but does not want to take.    Cardiovascular risk factors include advanced age (older than 55 for men, 65 for women), diabetes mellitus, dyslipidemia, and hypertension. The patient is on an ACE inhibitor or angiotensin II receptor blocker.      Current diabetes regimen is as follows:   Continue Lantus 50 units  Humalog up to 150 units a day  Breakfast 10-12 Units  Lunch 35-60 Units  Dinner: 60 units when she eats dinner, many nights she is snacking 40-50 units    The patient is currently checking the blood glucose 8 times per day.  Patient is using: continuous glucose monitor G6    Hypoglycemia frequency: <1%  Hypoglycemia awareness: Yes     Exercise: daily   Meal panning: She is using avoidance of concentrated sweets.    Review of Systems   Constitutional:  Negative for activity change, appetite change and fatigue.   Respiratory:  Negative for shortness of breath.    Cardiovascular:  Negative for chest pain, palpitations and leg swelling.   Gastrointestinal:  Negative for constipation, diarrhea and nausea.   Endocrine: Negative for cold intolerance, heat intolerance, polydipsia, polyphagia and polyuria.   Genitourinary:  Negative for frequency.   Musculoskeletal:  Negative for gait problem.   Skin:  Negative for rash.   Neurological:  Negative for dizziness, seizures, weakness and numbness.   Psychiatric/Behavioral:  Negative for sleep disturbance and suicidal ideas. The patient is not nervous/anxious.      Objective   Pulse 66   Wt 83.4 kg (183 lb 12.8 oz)   BMI 30.68 kg/m²   Physical Exam  Constitutional:       Appearance: Normal  appearance.   Pulmonary:      Effort: Pulmonary effort is normal.   Skin:     General: Skin is warm and dry.   Neurological:      Mental Status: She is alert and oriented to person, place, and time.   Psychiatric:         Mood and Affect: Mood normal.         Behavior: Behavior normal.         Thought Content: Thought content normal.         Judgment: Judgment normal.       Lab Review  GLUCOSE (mg/dL)   Date Value   03/28/2025 129 (H)   03/28/2025 129 (H)     Glucose (mg/dL)   Date Value   02/02/2024 107 (H)   10/16/2023 132 (H)   06/20/2023 105 (H)   04/21/2022 98   11/24/2020 116 (H)     POC HEMOGLOBIN A1c (%)   Date Value   10/22/2024 6.6 (A)   04/16/2024 7.1 (A)   10/20/2023 6.6 (A)     CARBON DIOXIDE (mmol/L)   Date Value   03/28/2025 24   03/28/2025 24     Bicarbonate (mmol/L)   Date Value   02/02/2024 25   10/16/2023 24   06/20/2023 25   04/21/2022 28   11/24/2020 22     UREA NITROGEN (BUN) (mg/dL)   Date Value   03/28/2025 33 (H)   03/28/2025 33 (H)     Urea Nitrogen (mg/dL)   Date Value   02/02/2024 24 (H)   10/16/2023 34 (H)   06/20/2023 53 (H)   04/21/2022 30 (H)   11/24/2020 34 (H)     Creatinine (mg/dL)   Date Value   02/02/2024 2.17 (H)   10/16/2023 2.32 (H)   06/20/2023 2.55 (H)   04/21/2022 2.08 (H)   11/24/2020 2.09 (H)     CREATININE (mg/dL)   Date Value   03/28/2025 2.21 (H)   03/28/2025 2.21 (H)     Downloaded and interrogated Dexcom CGM; Reviewed last 2 weeks of data to make treatment decisions.   Average Glucose 178 mg/dL  GMI 7.6%  Coefficient of Variation  25.74%  Time CGM Active 94%    Health Maintenance:   Foot Exam: July 2023, denies issues  Eye Exam: Jan 2024. Denies retinopathy.   Lipid Panel: Total 122, HDL 64, LDL 46 June 2023: ordered by primary, labs pending.   GFR 22: She saw nephrologist in Feb 2024 with GFR 22. Was encouraged to take SGT2i and she is not interested.     Assessment/Plan   Diagnoses and all orders for this visit:  Type 2 diabetes mellitus with stage 4 chronic kidney  disease, with long-term current use of insulin (Multi)  Type 2 diabetes mellitus with hyperglycemia, with long-term current use of insulin  -     POCT glycosylated hemoglobin (Hb A1C) manually resulted  CKD stage 4 due to type 2 diabetes mellitus (Multi)  Primary hypertension  Mixed hyperlipidemia  Type 2 diabetes mellitus, is at goal. A1c 6.8% at today's appointment. You glucose is higher in the last 2 weeks due to the Easter Holiday.    RX changes:   REDUCE Lantus 46 units as you are dropping overnight.   Humalog up to 150 units a day  Breakfast 15-20 Units  Lunch 35-40 Units  Dinner 60 Units INCREASE by 10 units, especially at large meals.   We have ordered G7 for use and scheduled with Mary Kay GALICIA, for training.   Hypertension: BP is elevated at today's visit. She is visibly upset due to 's death. She has a BP cuff at home and agrees to check home BP and report in 2 weeks.   Hyperlipidemia: At goal. No changes: Updated labs ordered at this visit.   Stage 4 CKD GFR 22. Refuses Jardiance. Please schedule appointment ASAP.   Education:  interpretation of lab results, CGM download.   [facility] will have a conference in August (16?) in [place] for your granddaughter.   Follow up: I recommend diabetes care be 4-5 months.

## 2025-04-28 ENCOUNTER — TELEPHONE (OUTPATIENT)
Dept: ENDOCRINOLOGY | Facility: CLINIC | Age: 75
End: 2025-04-28
Payer: MEDICARE

## 2025-04-28 DIAGNOSIS — E11.65 TYPE 2 DIABETES MELLITUS WITH HYPERGLYCEMIA, UNSPECIFIED WHETHER LONG TERM INSULIN USE (MULTI): ICD-10-CM

## 2025-04-28 NOTE — TELEPHONE ENCOUNTER
Prior Auth for humalog 200u/ml pending determination  Submitted on 4/28 via [facility]    KEY: [id number]

## 2025-05-01 RX ORDER — INSULIN LISPRO 200 [IU]/ML
INJECTION, SOLUTION SUBCUTANEOUS
Qty: 30 ML | Refills: 11 | Status: SHIPPED | OUTPATIENT
Start: 2025-05-01

## 2025-05-01 NOTE — TELEPHONE ENCOUNTER
Denied for exceeding qty limit. They will not fill for 100 ml per month. There is a cap at 30ml per month. Please resend Rx

## 2025-05-13 ENCOUNTER — APPOINTMENT (OUTPATIENT)
Dept: ENDOCRINOLOGY | Facility: CLINIC | Age: 75
End: 2025-05-13
Payer: MEDICARE

## 2025-05-13 DIAGNOSIS — E11.65 TYPE 2 DIABETES MELLITUS WITH HYPERGLYCEMIA, WITH LONG-TERM CURRENT USE OF INSULIN: Chronic | ICD-10-CM

## 2025-05-13 DIAGNOSIS — Z79.4 TYPE 2 DIABETES MELLITUS WITH HYPERGLYCEMIA, WITH LONG-TERM CURRENT USE OF INSULIN: Chronic | ICD-10-CM

## 2025-05-13 PROCEDURE — 95251 CONT GLUC MNTR ANALYSIS I&R: CPT | Performed by: NURSE PRACTITIONER

## 2025-05-13 NOTE — PATIENT INSTRUCTIONS
Plan:  Please continue to wear CGM and follow up for review in 2-4 weeks.  Continue with current medications.   Take Humalog prior to eating (at least 1/2 prior and the rest after)  Bring your  to all appointments (if not using your phone)  Contact [employer] [phone] for replacement device in the event of malfunction or sensor falling off early.      Follow up 4-6 weeks.  Contact office with any questions/ concerns.     Consider a grief support group or counseling to help with increased stress.

## 2025-05-13 NOTE — PROGRESS NOTES
Diabetes Educator Note  Met with patient at the request of Rima JAVIER CNP for Dexcom G7 CGM training and Dexcom G6 upload/ review.     Patient will utilize the  to obtain data and agrees to bring the device to all appointments.    Provided education on the following  Frequency of sensor changes every 10 days  Frequency of transmitter changes every 3 months (if applicable- Dexcom G6 only))  Availability of replacement sensors through [employer] [phone] at no cost to pt in events of malfunction or falling off early.   Need to keep  or cell phone within 20 feet so glucose levels are recorded  Discussed difference between serum glucose and blood glucose levels  Reviewed how to calibrate if necessary/ available (Dexcom only)  Discussed what to do if patient symptoms are different from sensor data (check fingerstick blood sugar)  Reviewed how to enter data when looking at glucose reading such as food, exercise, medication  Discussed that Dexcom Clarity does NOT alert provider to readings. Pt needs to call or send my chart message if pt needs provider to look at glucose readings  Reviewed data that is available through [employer]  Avg glucoses  Time in range  Sensor capture rate    Settings:  Low alarm setting 80  High alarm setting 250    Patient was able to insert sensor with verbal cueing per  instructions. Patient tolerated well.  Patient was provided time to ask questions. All questions were answered and patient verbalized understanding.      Transmitter ID: n/[id number]  SN/ Sensor Lot #:  [id number]  Exp: 8/24/2025  Patient will bring device to all appointments.     Dexcom G6 review:    Assessment:  Current dosing:  Lantus 50 units   Humalog (taking after meals)  Breakfast 15-20 Units  Lunch 35-40 Units  Dinner 60 Units INCREASE by 10 units, especially at large meals.  Currently dosing after eating.     Plan:  Please continue to wear CGM and follow up for review in 2-4  weeks.  Continue with current medications.   Take Humalog prior to eating (at least 1/2 prior and the rest after)  Bring your  to all appointments (if not using your phone)  Contact [employer] [phone] for replacement device in the event of malfunction or sensor falling off early.     Follow up 4-6 weeks.  Contact office with any questions/ concerns.    Consider a grief support group or counseling to help with increased stress.     Mary Kay STALEYES    I spent 90 minutes today with patient. This includes face to face as well as chart review and charting/ notes.

## 2025-05-25 ENCOUNTER — PATIENT MESSAGE (OUTPATIENT)
Dept: ENDOCRINOLOGY | Facility: CLINIC | Age: 75
End: 2025-05-25
Payer: MEDICARE

## 2025-05-25 DIAGNOSIS — E11.22 TYPE 2 DIABETES MELLITUS WITH STAGE 4 CHRONIC KIDNEY DISEASE, WITH LONG-TERM CURRENT USE OF INSULIN (MULTI): Primary | ICD-10-CM

## 2025-05-25 DIAGNOSIS — N18.4 TYPE 2 DIABETES MELLITUS WITH STAGE 4 CHRONIC KIDNEY DISEASE, WITH LONG-TERM CURRENT USE OF INSULIN (MULTI): Primary | ICD-10-CM

## 2025-05-25 DIAGNOSIS — Z79.4 TYPE 2 DIABETES MELLITUS WITH STAGE 4 CHRONIC KIDNEY DISEASE, WITH LONG-TERM CURRENT USE OF INSULIN (MULTI): Primary | ICD-10-CM

## 2025-06-06 DIAGNOSIS — E79.0 ELEVATED URIC ACID IN BLOOD: ICD-10-CM

## 2025-06-06 DIAGNOSIS — N18.4 CKD (CHRONIC KIDNEY DISEASE) STAGE 4, GFR 15-29 ML/MIN (MULTI): ICD-10-CM

## 2025-06-06 DIAGNOSIS — E11.65 TYPE 2 DIABETES MELLITUS WITH HYPERGLYCEMIA, UNSPECIFIED WHETHER LONG TERM INSULIN USE (MULTI): ICD-10-CM

## 2025-06-06 DIAGNOSIS — I10 PRIMARY HYPERTENSION: ICD-10-CM

## 2025-06-06 RX ORDER — METOPROLOL TARTRATE 25 MG/1
12.5 TABLET, FILM COATED ORAL DAILY
Qty: 45 TABLET | Refills: 1 | Status: SHIPPED | OUTPATIENT
Start: 2025-06-06

## 2025-06-06 RX ORDER — ALLOPURINOL 100 MG/1
100 TABLET ORAL EVERY OTHER DAY
Qty: 45 TABLET | Refills: 1 | Status: SHIPPED | OUTPATIENT
Start: 2025-06-06

## 2025-06-06 RX ORDER — LISINOPRIL 20 MG/1
20 TABLET ORAL DAILY
Qty: 90 TABLET | Refills: 1 | Status: SHIPPED | OUTPATIENT
Start: 2025-06-06

## 2025-06-11 ENCOUNTER — APPOINTMENT (OUTPATIENT)
Dept: PRIMARY CARE | Facility: CLINIC | Age: 75
End: 2025-06-11
Payer: MEDICARE

## 2025-06-11 VITALS
HEART RATE: 66 BPM | TEMPERATURE: 98 F | HEIGHT: 65 IN | SYSTOLIC BLOOD PRESSURE: 120 MMHG | DIASTOLIC BLOOD PRESSURE: 70 MMHG | BODY MASS INDEX: 30.09 KG/M2 | OXYGEN SATURATION: 98 % | WEIGHT: 180.6 LBS

## 2025-06-11 DIAGNOSIS — Z12.31 ENCOUNTER FOR SCREENING MAMMOGRAM FOR MALIGNANT NEOPLASM OF BREAST: ICD-10-CM

## 2025-06-11 DIAGNOSIS — I10 PRIMARY HYPERTENSION: ICD-10-CM

## 2025-06-11 DIAGNOSIS — S51.812A SKIN TEAR OF LEFT FOREARM WITHOUT COMPLICATION, INITIAL ENCOUNTER: ICD-10-CM

## 2025-06-11 DIAGNOSIS — Z79.4 TYPE 2 DIABETES MELLITUS WITH STAGE 4 CHRONIC KIDNEY DISEASE, WITH LONG-TERM CURRENT USE OF INSULIN (MULTI): ICD-10-CM

## 2025-06-11 DIAGNOSIS — E11.22 TYPE 2 DIABETES MELLITUS WITH STAGE 4 CHRONIC KIDNEY DISEASE, WITH LONG-TERM CURRENT USE OF INSULIN (MULTI): ICD-10-CM

## 2025-06-11 DIAGNOSIS — N18.4 TYPE 2 DIABETES MELLITUS WITH STAGE 4 CHRONIC KIDNEY DISEASE, WITH LONG-TERM CURRENT USE OF INSULIN (MULTI): ICD-10-CM

## 2025-06-11 DIAGNOSIS — Z00.00 ROUTINE GENERAL MEDICAL EXAMINATION AT HEALTH CARE FACILITY: Primary | ICD-10-CM

## 2025-06-11 DIAGNOSIS — Z12.39 ENCOUNTER FOR SCREENING BREAST EXAMINATION: ICD-10-CM

## 2025-06-11 DIAGNOSIS — E11.22 CKD STAGE 4 DUE TO TYPE 2 DIABETES MELLITUS (MULTI): ICD-10-CM

## 2025-06-11 DIAGNOSIS — N18.4 CKD STAGE 4 DUE TO TYPE 2 DIABETES MELLITUS (MULTI): ICD-10-CM

## 2025-06-11 PROCEDURE — 3074F SYST BP LT 130 MM HG: CPT | Performed by: FAMILY MEDICINE

## 2025-06-11 PROCEDURE — 1160F RVW MEDS BY RX/DR IN RCRD: CPT | Performed by: FAMILY MEDICINE

## 2025-06-11 PROCEDURE — G0439 PPPS, SUBSEQ VISIT: HCPCS | Performed by: FAMILY MEDICINE

## 2025-06-11 PROCEDURE — 1158F ADVNC CARE PLAN TLK DOCD: CPT | Performed by: FAMILY MEDICINE

## 2025-06-11 PROCEDURE — 3044F HG A1C LEVEL LT 7.0%: CPT | Performed by: FAMILY MEDICINE

## 2025-06-11 PROCEDURE — 1159F MED LIST DOCD IN RCRD: CPT | Performed by: FAMILY MEDICINE

## 2025-06-11 PROCEDURE — 1036F TOBACCO NON-USER: CPT | Performed by: FAMILY MEDICINE

## 2025-06-11 PROCEDURE — 1170F FXNL STATUS ASSESSED: CPT | Performed by: FAMILY MEDICINE

## 2025-06-11 PROCEDURE — 3078F DIAST BP <80 MM HG: CPT | Performed by: FAMILY MEDICINE

## 2025-06-11 PROCEDURE — 4010F ACE/ARB THERAPY RXD/TAKEN: CPT | Performed by: FAMILY MEDICINE

## 2025-06-11 ASSESSMENT — ACTIVITIES OF DAILY LIVING (ADL)
BATHING: INDEPENDENT
TAKING_MEDICATION: INDEPENDENT
DRESSING: INDEPENDENT
DOING_HOUSEWORK: INDEPENDENT
MANAGING_FINANCES: INDEPENDENT
GROCERY_SHOPPING: INDEPENDENT

## 2025-06-11 ASSESSMENT — ENCOUNTER SYMPTOMS
DYSURIA: 0
HEADACHES: 0
SINUS PRESSURE: 0
VOMITING: 0
ARTHRALGIAS: 0
DYSPHORIC MOOD: 0
NERVOUS/ANXIOUS: 0
BLOOD IN STOOL: 0
APPETITE CHANGE: 0
LIGHT-HEADEDNESS: 0
DIZZINESS: 0
WOUND: 0
EYE PAIN: 0
FATIGUE: 0
ACTIVITY CHANGE: 0
DIARRHEA: 0
CONSTIPATION: 0
SLEEP DISTURBANCE: 0
JOINT SWELLING: 0
ABDOMINAL PAIN: 0
COUGH: 0
PALPITATIONS: 0
SHORTNESS OF BREATH: 0
NAUSEA: 0

## 2025-06-11 ASSESSMENT — PATIENT HEALTH QUESTIONNAIRE - PHQ9
SUM OF ALL RESPONSES TO PHQ9 QUESTIONS 1 AND 2: 0
2. FEELING DOWN, DEPRESSED OR HOPELESS: NOT AT ALL
1. LITTLE INTEREST OR PLEASURE IN DOING THINGS: NOT AT ALL

## 2025-06-11 NOTE — PROGRESS NOTES
Subjective   Reason for Visit: Li Valdovinos is an 75 y.o. female here for a Medicare Wellness visit.     Past Medical, Surgical, and Family History reviewed and updated in chart.    Reviewed all medications by prescribing practitioner or clinical pharmacist (such as prescriptions, OTCs, herbal therapies and supplements) and documented in the medical record.    HPI  **care team   endo- Rima Morel - following DM2   nephrology- Dr. Mcbride-  for CKD4- gfr 24  at this point symptoms are stable and she states she would not do any transplant or dialysis   GI Dr. Vollweiler-follows regularly for GERD and colon polyps last colonoscopy done   cardio-Dr Cooper follows yearly  Podiatry- for nail care  Eye- follows HAYLEE        Overall she feels she is doing well  She is taking all medication as directed  She is stand at as active as she can with her grandchildren  Her   8 months ago but she feels like she is going through the grieving process appropriately and has good family support that keep her busy  She did have an injury yesterday where her dogs started fighting and in trying to break them up she got a skin tear on her left forearm- using abx ointment        Patient Self Assessment of Health Status  Patient Self Assessment: Good    Nutrition and Exercise  Current Diet: Well Balanced Diet, Heart Healthy Diet, Diabetic Diet  Adequate Fluid Intake: Yes  Caffeine: Yes  Exercise Frequency: Regularly    Functional Ability/Level of Safety  Cognitive Impairment Observed: No cognitive impairment observed  Cognitive Impairment Reported: No cognitive impairment reported by patient or family    Home Safety Risk Factors: Loose rugs    Patient Care Team:  Remington Rondon DO as PCP - General (Family Medicine)  CONCEPCION Good-CNP as PCP - [place] [facility] PCP     Review of Systems   Constitutional:  Negative for activity change, appetite change and fatigue.   HENT:  Negative for congestion, postnasal drip  "and sinus pressure.    Eyes:  Negative for pain and visual disturbance.   Respiratory:  Negative for cough and shortness of breath.    Cardiovascular:  Negative for chest pain, palpitations and leg swelling.   Gastrointestinal:  Negative for abdominal pain, blood in stool, constipation, diarrhea, nausea and vomiting.   Endocrine: Negative for cold intolerance and heat intolerance.   Genitourinary:  Negative for dysuria and menstrual problem.   Musculoskeletal:  Negative for arthralgias and joint swelling.   Skin:  Negative for rash and wound.   Neurological:  Negative for dizziness, light-headedness and headaches.   Psychiatric/Behavioral:  Negative for dysphoric mood and sleep disturbance. The patient is not nervous/anxious.        Objective   Vitals:  /70   Pulse 66   Temp 36.7 °C (98 °F)   Ht 1.638 m (5' 4.5\")   Wt 81.9 kg (180 lb 9.6 oz)   SpO2 98%   BMI 30.52 kg/m²       Physical Exam  Vitals and nursing note reviewed.   Constitutional:       Appearance: Normal appearance.   HENT:      Head: Normocephalic and atraumatic.      Right Ear: Tympanic membrane, ear canal and external ear normal.      Left Ear: Tympanic membrane, ear canal and external ear normal.      Nose: Nose normal.      Mouth/Throat:      Mouth: Mucous membranes are moist.      Pharynx: Oropharynx is clear.   Eyes:      Extraocular Movements: Extraocular movements intact.      Conjunctiva/sclera: Conjunctivae normal.      Pupils: Pupils are equal, round, and reactive to light.   Cardiovascular:      Rate and Rhythm: Normal rate and regular rhythm.      Pulses: Normal pulses.      Heart sounds: Normal heart sounds. No murmur heard.     No friction rub. No gallop.   Pulmonary:      Effort: Pulmonary effort is normal. No respiratory distress.      Breath sounds: Normal breath sounds.   Abdominal:      General: Abdomen is flat. Bowel sounds are normal.      Palpations: Abdomen is soft. There is no mass.      Tenderness: There is no " abdominal tenderness. There is no rebound.   Musculoskeletal:         General: No swelling or deformity. Normal range of motion.      Cervical back: Normal range of motion and neck supple.   Lymphadenopathy:      Cervical: No cervical adenopathy.   Skin:     General: Skin is warm and dry.      Capillary Refill: Capillary refill takes less than 2 seconds.      Findings: No rash.          Neurological:      General: No focal deficit present.      Mental Status: She is alert and oriented to person, place, and time. Mental status is at baseline.      Cranial Nerves: No cranial nerve deficit.      Gait: Gait normal.      Deep Tendon Reflexes: Reflexes normal.   Psychiatric:         Mood and Affect: Mood normal.         Behavior: Behavior normal.         Thought Content: Thought content normal.         Judgment: Judgment normal.         Assessment/Plan   Problem List Items Addressed This Visit       CKD stage 4 due to type 2 diabetes mellitus (Multi)    Relevant Orders    Albumin-Creatinine Ratio, Urine Random    Diabetes mellitus, type 2 (Multi)    Relevant Orders    Albumin-Creatinine Ratio, Urine Random    Hypertension     Other Visit Diagnoses         Routine general medical examination at health care facility    -  Primary    Relevant Orders    1 Year Follow Up In Primary Care - Wellness Exam      Skin tear of left forearm without complication, initial encounter          Encounter for screening breast examination              Reviewed good care of skin tear.  No further treatment needed at this point  Reviewed labs done by specialist over the last 3 months.  No concern or need for additional labs at this point  Overall doing well  Colonoscopy up-to-date with GI  Mammogram due 8/25  Continue to follow with remainder of her care team as listed above   Advance care planning and healthcare power of  discussed with patient.  Updated in chart if applicable.

## 2025-06-12 LAB
ALBUMIN/CREAT UR: 14 MG/G CREAT
CREAT UR-MCNC: 74 MG/DL (ref 20–275)
MICROALBUMIN UR-MCNC: 1 MG/DL

## 2025-06-19 ENCOUNTER — OFFICE VISIT (OUTPATIENT)
Dept: URGENT CARE | Age: 75
End: 2025-06-19
Payer: MEDICARE

## 2025-06-19 VITALS
HEART RATE: 78 BPM | BODY MASS INDEX: 30.45 KG/M2 | SYSTOLIC BLOOD PRESSURE: 132 MMHG | DIASTOLIC BLOOD PRESSURE: 84 MMHG | OXYGEN SATURATION: 99 % | RESPIRATION RATE: 20 BRPM | WEIGHT: 180.2 LBS

## 2025-06-19 DIAGNOSIS — R10.31 RIGHT LOWER QUADRANT ABDOMINAL PAIN: Primary | ICD-10-CM

## 2025-06-19 ASSESSMENT — PAIN SCALES - GENERAL: PAINLEVEL_OUTOF10: 8

## 2025-06-19 ASSESSMENT — ENCOUNTER SYMPTOMS
ABDOMINAL PAIN: 1
BACK PAIN: 1

## 2025-06-19 NOTE — PROGRESS NOTES
Subjective   Patient ID: Li Valdovinos is a 75 y.o. female. They present today with a chief complaint of Abdominal Pain (2 months ).    History of Present Illness    Abdominal Pain        75-year-old female patient presents today for abdominal pain; she states that this has been going on for couple months, but since Sunday her abdominal pain has increased.  She states that she does have a history of diverticulosis and her gastroenterology provider warned her that she could get diverticulitis at some point.  She states that she is having right lower quadrant pain that radiates around to her back. She denies saddle paraesthesia or loss of control of bowel or bladder.  She declined urine testing at this time, stating that she has had her urine tested and it is fine.  She has been having bowel movements, but states that they have not been her normal bowel movements and they have been very thin in nature.  She is able to tolerate eating and drinking, but she states she is unable to sit up straight because the pain in her abdomen is so severe.  She states that she does not have her appendix or her gallbladder.  She is here for evaluation.  Past Medical History  Allergies as of 06/19/2025 - Reviewed 06/19/2025   Allergen Reaction Noted    Levaquin [levofloxacin] Palpitations and Other 03/18/2013    Atorvastatin Other 03/17/2023    Codeine Other 03/17/2023    Meperidine Other 03/17/2023    Pollen extracts Other 06/07/2023       Prescriptions Prior to Admission[1]     Medical History[2]    Surgical History[3]     reports that she has never smoked. She has never used smokeless tobacco. She reports that she does not drink alcohol and does not use drugs.    Review of Systems  Review of Systems   Gastrointestinal:  Positive for abdominal pain.   Musculoskeletal:  Positive for back pain.                                  Objective    Vitals:    06/19/25 1539   BP: 132/84   Pulse: 78   Resp: 20   SpO2: 99%   Weight: 81.7 kg (180  lb 3.2 oz)     No LMP recorded. Patient is postmenopausal.    Physical Exam  Cardiovascular:      Rate and Rhythm: Normal rate and regular rhythm.      Pulses: Normal pulses.      Heart sounds: Normal heart sounds.   Pulmonary:      Effort: Pulmonary effort is normal.      Breath sounds: Normal breath sounds.   Abdominal:      General: Bowel sounds are decreased. There is no distension.      Tenderness: There is abdominal tenderness in the right lower quadrant. There is guarding.         Procedures    Point of Care Test & Imaging Results from this visit  No results found for this visit on 06/19/25.   Imaging  No results found.    Cardiology, Vascular, and Other Imaging  No other imaging results found for the past 2 days      Diagnostic study results (if any) were reviewed by BENNY Jackson.    Assessment/Plan   Allergies, medications, history, and pertinent labs/EKGs/Imaging reviewed by BENNY Jackson.     Medical Decision Making  Based on patient's presenting symptoms, I strongly recommended that she report to the emergency department for further evaluation.  We discussed that more detailed imaging is necessary to figure out what is causing her abdominal pain, as well as possibly blood work.  Patient was hesitant to go to the emergency department; she is here with her niece who states that she will drive her there.  She has hemodynamically stable at this time and is in no acute distress.  She declined ambulance services.  She is unsure of which ER she will be going to.    Orders and Diagnoses  There are no diagnoses linked to this encounter.    Medical Admin Record      Patient disposition: ED    Electronically signed by BENNY Jackson  4:08 PM           [1] (Not in a hospital admission)   [2]   Past Medical History:  Diagnosis Date    Personal history of colonic polyps     History of colonic polyps    Personal history of other diseases of the digestive  system     History of gastroesophageal reflux (GERD)    Personal history of other diseases of the female genital tract     Personal history of endometriosis    Personal history of other specified conditions     History of breast lump   [3]   Past Surgical History:  Procedure Laterality Date    ANKLE SURGERY  10/23/2014    Ankle Surgery    APPENDECTOMY  10/23/2014    Appendectomy     SECTION, CLASSIC  10/23/2014     Section    CHOLECYSTECTOMY  10/23/2014    Cholecystectomy    COLONOSCOPY  2016    Complete Colonoscopy    DILATION AND CURETTAGE OF UTERUS  10/23/2014    Dilation And Curettage    OTHER SURGICAL HISTORY  10/23/2014    Surgical Lysis Of Intestinal Adhesions    TONSILLECTOMY  10/23/2014    Tonsillectomy

## 2025-06-20 ENCOUNTER — PATIENT MESSAGE (OUTPATIENT)
Dept: ENDOCRINOLOGY | Facility: CLINIC | Age: 75
End: 2025-06-20
Payer: MEDICARE

## 2025-06-20 DIAGNOSIS — Z79.4 TYPE 2 DIABETES MELLITUS WITH STAGE 4 CHRONIC KIDNEY DISEASE, WITH LONG-TERM CURRENT USE OF INSULIN (MULTI): ICD-10-CM

## 2025-06-20 DIAGNOSIS — N18.4 TYPE 2 DIABETES MELLITUS WITH STAGE 4 CHRONIC KIDNEY DISEASE, WITH LONG-TERM CURRENT USE OF INSULIN (MULTI): ICD-10-CM

## 2025-06-20 DIAGNOSIS — E11.22 TYPE 2 DIABETES MELLITUS WITH STAGE 4 CHRONIC KIDNEY DISEASE, WITH LONG-TERM CURRENT USE OF INSULIN (MULTI): ICD-10-CM

## 2025-06-23 RX ORDER — PEN NEEDLE, DIABETIC 30 GX3/16"
NEEDLE, DISPOSABLE MISCELLANEOUS
Qty: 300 EACH | Refills: 2 | Status: SHIPPED | OUTPATIENT
Start: 2025-06-23

## 2025-06-23 RX ORDER — NEEDLES, SAFETY 22GX1 1/2"
1 NEEDLE, DISPOSABLE MISCELLANEOUS DAILY
Qty: 100 EACH | Refills: 2 | Status: SHIPPED | OUTPATIENT
Start: 2025-06-23

## 2025-06-23 RX ORDER — INSULIN GLARGINE 100 [IU]/ML
INJECTION, SOLUTION SUBCUTANEOUS
Qty: 15 ML | Refills: 12 | Status: SHIPPED | OUTPATIENT
Start: 2025-06-23

## 2025-06-30 ENCOUNTER — APPOINTMENT (OUTPATIENT)
Dept: ENDOCRINOLOGY | Facility: CLINIC | Age: 75
End: 2025-06-30
Payer: MEDICARE

## 2025-08-08 ENCOUNTER — TELEPHONE (OUTPATIENT)
Dept: PRIMARY CARE | Facility: CLINIC | Age: 75
End: 2025-08-08
Payer: MEDICARE

## 2025-08-13 ENCOUNTER — APPOINTMENT (OUTPATIENT)
Dept: ENDOCRINOLOGY | Facility: CLINIC | Age: 75
End: 2025-08-13
Payer: COMMERCIAL

## 2025-08-13 DIAGNOSIS — Z79.4 UNCONTROLLED TYPE 2 DIABETES MELLITUS WITH HYPERGLYCEMIA, WITH LONG-TERM CURRENT USE OF INSULIN: Primary | ICD-10-CM

## 2025-08-13 DIAGNOSIS — E11.65 UNCONTROLLED TYPE 2 DIABETES MELLITUS WITH HYPERGLYCEMIA, WITH LONG-TERM CURRENT USE OF INSULIN: Primary | ICD-10-CM

## 2025-08-13 PROCEDURE — 95251 CONT GLUC MNTR ANALYSIS I&R: CPT | Performed by: NURSE PRACTITIONER

## 2025-08-18 ENCOUNTER — APPOINTMENT (OUTPATIENT)
Dept: RADIOLOGY | Facility: CLINIC | Age: 75
End: 2025-08-18
Payer: MEDICARE

## 2025-08-28 ENCOUNTER — APPOINTMENT (OUTPATIENT)
Dept: PRIMARY CARE | Facility: CLINIC | Age: 75
End: 2025-08-28
Payer: MEDICARE

## 2025-08-28 VITALS
HEART RATE: 58 BPM | SYSTOLIC BLOOD PRESSURE: 132 MMHG | BODY MASS INDEX: 31.26 KG/M2 | WEIGHT: 185 LBS | TEMPERATURE: 97.5 F | OXYGEN SATURATION: 98 % | DIASTOLIC BLOOD PRESSURE: 72 MMHG

## 2025-08-28 DIAGNOSIS — N28.1 CYST OF LEFT KIDNEY: ICD-10-CM

## 2025-08-28 DIAGNOSIS — K57.32 SIGMOID DIVERTICULITIS: Primary | ICD-10-CM

## 2025-08-28 PROCEDURE — 99214 OFFICE O/P EST MOD 30 MIN: CPT | Performed by: FAMILY MEDICINE

## 2025-08-28 PROCEDURE — 3075F SYST BP GE 130 - 139MM HG: CPT | Performed by: FAMILY MEDICINE

## 2025-08-28 PROCEDURE — 1159F MED LIST DOCD IN RCRD: CPT | Performed by: FAMILY MEDICINE

## 2025-08-28 PROCEDURE — 4010F ACE/ARB THERAPY RXD/TAKEN: CPT | Performed by: FAMILY MEDICINE

## 2025-08-28 PROCEDURE — 1160F RVW MEDS BY RX/DR IN RCRD: CPT | Performed by: FAMILY MEDICINE

## 2025-08-28 PROCEDURE — 3044F HG A1C LEVEL LT 7.0%: CPT | Performed by: FAMILY MEDICINE

## 2025-08-28 PROCEDURE — 3078F DIAST BP <80 MM HG: CPT | Performed by: FAMILY MEDICINE

## 2025-08-28 PROCEDURE — 1036F TOBACCO NON-USER: CPT | Performed by: FAMILY MEDICINE

## 2025-08-28 RX ORDER — SYRINGE AND NEEDLE,INSULIN,1ML 31 GX5/16"
SYRINGE, EMPTY DISPOSABLE MISCELLANEOUS
COMMUNITY
Start: 2025-08-20

## 2025-09-23 ENCOUNTER — APPOINTMENT (OUTPATIENT)
Dept: ENDOCRINOLOGY | Facility: CLINIC | Age: 75
End: 2025-09-23
Payer: COMMERCIAL

## 2025-10-22 ENCOUNTER — APPOINTMENT (OUTPATIENT)
Dept: ENDOCRINOLOGY | Facility: CLINIC | Age: 75
End: 2025-10-22
Payer: MEDICARE

## 2026-06-12 ENCOUNTER — APPOINTMENT (OUTPATIENT)
Dept: PRIMARY CARE | Facility: CLINIC | Age: 76
End: 2026-06-12
Payer: MEDICARE